# Patient Record
Sex: FEMALE | Race: WHITE | NOT HISPANIC OR LATINO | Employment: UNEMPLOYED | URBAN - METROPOLITAN AREA
[De-identification: names, ages, dates, MRNs, and addresses within clinical notes are randomized per-mention and may not be internally consistent; named-entity substitution may affect disease eponyms.]

---

## 2017-05-23 RX ORDER — PRAMIPEXOLE DIHYDROCHLORIDE 0.5 MG/1
1.5 TABLET ORAL NIGHTLY
Qty: 90 TABLET | Refills: 3 | OUTPATIENT
Start: 2017-05-23

## 2017-07-11 PROBLEM — M54.12 CERVICAL RADICULOPATHY: Status: ACTIVE | Noted: 2017-07-11

## 2017-07-11 PROBLEM — M25.511 RIGHT SHOULDER PAIN: Status: ACTIVE | Noted: 2017-07-11

## 2017-07-13 RX ORDER — PRAMIPEXOLE DIHYDROCHLORIDE 0.5 MG/1
1.5 TABLET ORAL NIGHTLY
Qty: 90 TABLET | Refills: 3 | OUTPATIENT
Start: 2017-07-13

## 2017-07-13 RX ORDER — ALPRAZOLAM 0.5 MG/1
0.5 TABLET ORAL 3 TIMES DAILY PRN
Qty: 60 TABLET | Refills: 5 | OUTPATIENT
Start: 2017-07-13

## 2017-07-24 RX ORDER — PRAMIPEXOLE DIHYDROCHLORIDE 0.5 MG/1
1.5 TABLET ORAL NIGHTLY
Qty: 90 TABLET | Refills: 0 | Status: SHIPPED | OUTPATIENT
Start: 2017-07-24 | End: 2017-07-25 | Stop reason: SDUPTHER

## 2017-07-24 RX ORDER — ALPRAZOLAM 0.5 MG/1
0.5 TABLET ORAL 3 TIMES DAILY PRN
Qty: 60 TABLET | Refills: 0 | Status: SHIPPED | OUTPATIENT
Start: 2017-07-24 | End: 2017-07-25 | Stop reason: SDUPTHER

## 2017-07-24 NOTE — TELEPHONE ENCOUNTER
I have refilled the patient's requested medication x 1 month.  However, the patient is due for an evaluation in the office.  Call the patient on the phone and book the patient with EITHER ME OR ROLA ROOT NP for a visit.    PLEASE DOCUMENT THE FACT THAT YOU HAVE CONTACTED THE PATIENT IN THE CHART FOR FUTURE REFERENCE.    Health Maintenance Due   Topic Date Due    TETANUS VACCINE  08/27/1987    Pneumococcal PPSV23 (Medium Risk) (1) 08/27/1987    Lipid Panel  12/17/2014    Mammogram  08/01/2015

## 2017-07-25 ENCOUNTER — PATIENT MESSAGE (OUTPATIENT)
Dept: FAMILY MEDICINE | Facility: CLINIC | Age: 47
End: 2017-07-25

## 2017-07-25 ENCOUNTER — OFFICE VISIT (OUTPATIENT)
Dept: FAMILY MEDICINE | Facility: CLINIC | Age: 47
End: 2017-07-25
Payer: MEDICAID

## 2017-07-25 VITALS
TEMPERATURE: 99 F | SYSTOLIC BLOOD PRESSURE: 110 MMHG | HEIGHT: 67 IN | WEIGHT: 237 LBS | BODY MASS INDEX: 37.2 KG/M2 | DIASTOLIC BLOOD PRESSURE: 72 MMHG | HEART RATE: 74 BPM

## 2017-07-25 DIAGNOSIS — Z00.00 ANNUAL PHYSICAL EXAM: Primary | ICD-10-CM

## 2017-07-25 DIAGNOSIS — F32.A DEPRESSION, UNSPECIFIED DEPRESSION TYPE: ICD-10-CM

## 2017-07-25 DIAGNOSIS — G25.81 RLS (RESTLESS LEGS SYNDROME): ICD-10-CM

## 2017-07-25 DIAGNOSIS — G43.909 MIGRAINE WITHOUT STATUS MIGRAINOSUS, NOT INTRACTABLE, UNSPECIFIED MIGRAINE TYPE: ICD-10-CM

## 2017-07-25 DIAGNOSIS — E34.9 HORMONE DEFICIENCY: ICD-10-CM

## 2017-07-25 DIAGNOSIS — F41.9 ANXIETY: ICD-10-CM

## 2017-07-25 DIAGNOSIS — J30.1 ALLERGIC RHINITIS DUE TO POLLEN, UNSPECIFIED CHRONICITY, UNSPECIFIED SEASONALITY: ICD-10-CM

## 2017-07-25 PROCEDURE — 99999 PR PBB SHADOW E&M-EST. PATIENT-LVL IV: CPT | Mod: PBBFAC,,, | Performed by: NURSE PRACTITIONER

## 2017-07-25 PROCEDURE — 99214 OFFICE O/P EST MOD 30 MIN: CPT | Mod: PBBFAC,PO | Performed by: NURSE PRACTITIONER

## 2017-07-25 PROCEDURE — 99396 PREV VISIT EST AGE 40-64: CPT | Mod: S$PBB,,, | Performed by: NURSE PRACTITIONER

## 2017-07-25 RX ORDER — HYDROXYZINE HYDROCHLORIDE 25 MG/1
25 TABLET, FILM COATED ORAL 3 TIMES DAILY
COMMUNITY
End: 2017-07-25

## 2017-07-25 RX ORDER — BUPROPION HYDROCHLORIDE 150 MG/1
150 TABLET ORAL DAILY
COMMUNITY
End: 2017-08-11 | Stop reason: SDUPTHER

## 2017-07-25 RX ORDER — ALPRAZOLAM 0.5 MG/1
0.5 TABLET ORAL 3 TIMES DAILY PRN
Qty: 60 TABLET | Refills: 5 | Status: SHIPPED | OUTPATIENT
Start: 2017-07-25 | End: 2017-08-11 | Stop reason: SDUPTHER

## 2017-07-25 RX ORDER — PRAMIPEXOLE DIHYDROCHLORIDE 0.5 MG/1
1.5 TABLET ORAL NIGHTLY
Qty: 90 TABLET | Refills: 0 | Status: SHIPPED | OUTPATIENT
Start: 2017-07-25 | End: 2017-07-27

## 2017-07-25 NOTE — PROGRESS NOTES
Subjective:       Patient ID: Suad Chapman is a 46 y.o. female.    Chief Complaint: Medication Refill  Pt in today for annual exam. Pt states that she sees psychiatry for anxiety, depression; states started wellbutrin last week. Denies SI/HI. RLS managed with medication; states works, but not as well as it has in the past; requests adjustment. Currently taking estrogen for HRT. Allergic rhinitis managed with inhaled antihistamine. Labs, mammogram due. Declines smoking cessation program; states wants to see if wellbutrin helps. Pt has no other complaints today.  Past Medical History:   Diagnosis Date    Allergy     Anxiety     Depression     RLS (restless legs syndrome)     Sciatica      Social History     Social History    Marital status:      Spouse name: N/A    Number of children: 2    Years of education: N/A     Occupational History          Social History Main Topics    Smoking status: Current Every Day Smoker     Packs/day: 1.00     Years: 30.00     Types: Cigarettes    Smokeless tobacco: Never Used      Comment: 1ppd    Alcohol use Yes      Comment: socially    Drug use: No    Sexual activity: Yes     Partners: Male     Other Topics Concern    Not on file     Social History Narrative    No narrative on file     Past Surgical History:   Procedure Laterality Date    BREAST SURGERY  2005    reduction    COSMETIC SURGERY  2005    tummy tuck    HYSTERECTOMY      TONSILLECTOMY         HPI  Review of Systems   Constitutional: Negative.    HENT: Negative.    Eyes: Negative.    Respiratory: Negative.    Cardiovascular: Negative.    Gastrointestinal: Negative.    Endocrine: Negative.    Genitourinary: Negative.    Musculoskeletal: Negative.    Skin: Negative.    Allergic/Immunologic: Negative.    Neurological: Negative.    Psychiatric/Behavioral: Negative.        Objective:      Physical Exam   Constitutional: She is oriented to person, place, and time. She appears well-developed and  well-nourished.   HENT:   Head: Normocephalic.   Right Ear: External ear normal.   Left Ear: External ear normal.   Nose: Nose normal.   Mouth/Throat: Oropharynx is clear and moist.   Eyes: Conjunctivae are normal. Pupils are equal, round, and reactive to light.   Neck: Normal range of motion. Neck supple.   Cardiovascular: Normal rate, regular rhythm and normal heart sounds.    Pulmonary/Chest: Effort normal and breath sounds normal.   Abdominal: Soft. Bowel sounds are normal.   Musculoskeletal: Normal range of motion.   Neurological: She is alert and oriented to person, place, and time.   Skin: Skin is warm and dry. Capillary refill takes 2 to 3 seconds.   Psychiatric: She has a normal mood and affect. Her behavior is normal. Judgment and thought content normal.   Nursing note and vitals reviewed.      Assessment:       1. Annual physical exam    2. RLS (restless legs syndrome)    3. Migraine without status migrainosus, not intractable, unspecified migraine type    4. Anxiety    5. Depression, unspecified depression type    6. Allergic rhinitis due to pollen, unspecified chronicity, unspecified seasonality    7. Hormone deficiency        Plan:           Suad was seen today for medication refill.    Diagnoses and all orders for this visit:    Annual physical exam  RLS (restless legs syndrome)  Migraine without status migrainosus, not intractable, unspecified migraine type  Anxiety  Depression, unspecified depression type  Allergic rhinitis due to pollen, unspecified chronicity, unspecified seasonality  Hormone deficiency  -     Mammo Digital Screening Bilateral With CAD; Future  -     ALT (SGPT); Future  -     TSH; Future  -     CBC auto differential; Future  BMP, lipid ordered previously  Mirapex request sent to PCP to approve  Continue current medications.

## 2017-07-27 RX ORDER — PRAMIPEXOLE DIHYDROCHLORIDE 0.5 MG/1
1.5 TABLET ORAL NIGHTLY
Qty: 90 TABLET | Refills: 5 | Status: SHIPPED | OUTPATIENT
Start: 2017-07-27 | End: 2017-08-11 | Stop reason: SDUPTHER

## 2017-08-09 ENCOUNTER — HOSPITAL ENCOUNTER (OUTPATIENT)
Dept: RADIOLOGY | Facility: HOSPITAL | Age: 47
Discharge: HOME | End: 2017-08-09
Attending: NURSE PRACTITIONER
Payer: MEDICAID

## 2017-08-09 VITALS — HEIGHT: 67 IN | WEIGHT: 236 LBS | BODY MASS INDEX: 37.04 KG/M2

## 2017-08-09 DIAGNOSIS — Z00.00 ANNUAL PHYSICAL EXAM: ICD-10-CM

## 2017-08-09 PROCEDURE — 77067 SCR MAMMO BI INCL CAD: CPT | Mod: 26,,, | Performed by: RADIOLOGY

## 2017-08-09 PROCEDURE — 77067 SCR MAMMO BI INCL CAD: CPT | Mod: TC

## 2017-08-09 PROCEDURE — 77063 BREAST TOMOSYNTHESIS BI: CPT | Mod: 26,,, | Performed by: RADIOLOGY

## 2017-08-11 ENCOUNTER — PATIENT MESSAGE (OUTPATIENT)
Dept: FAMILY MEDICINE | Facility: CLINIC | Age: 47
End: 2017-08-11

## 2017-08-11 DIAGNOSIS — G43.009 NONINTRACTABLE MIGRAINE, UNSPECIFIED MIGRAINE TYPE: ICD-10-CM

## 2017-08-11 DIAGNOSIS — J30.1 CHRONIC ALLERGIC RHINITIS DUE TO POLLEN, UNSPECIFIED SEASONALITY: ICD-10-CM

## 2017-08-11 DIAGNOSIS — J30.1 CHRONIC SEASONAL ALLERGIC RHINITIS DUE TO POLLEN: ICD-10-CM

## 2017-08-11 DIAGNOSIS — J40 BRONCHITIS: ICD-10-CM

## 2017-08-11 DIAGNOSIS — H69.91 ETD (EUSTACHIAN TUBE DYSFUNCTION), RIGHT: ICD-10-CM

## 2017-08-11 RX ORDER — HYDROXYZINE PAMOATE 25 MG/1
CAPSULE ORAL
Qty: 60 CAPSULE | Refills: 5 | Status: SHIPPED | OUTPATIENT
Start: 2017-08-11 | End: 2018-11-06 | Stop reason: SDUPTHER

## 2017-08-11 RX ORDER — ALPRAZOLAM 0.5 MG/1
0.5 TABLET ORAL 3 TIMES DAILY PRN
Qty: 60 TABLET | Refills: 5 | Status: SHIPPED | OUTPATIENT
Start: 2017-08-11 | End: 2018-12-17 | Stop reason: SDUPTHER

## 2017-08-11 RX ORDER — BUPROPION HYDROCHLORIDE 150 MG/1
150 TABLET ORAL DAILY
Qty: 30 TABLET | Refills: 11 | Status: SHIPPED | OUTPATIENT
Start: 2017-08-11 | End: 2018-11-06

## 2017-08-11 RX ORDER — ALBUTEROL SULFATE 90 UG/1
2 AEROSOL, METERED RESPIRATORY (INHALATION) EVERY 6 HOURS PRN
Qty: 18 G | Refills: 11 | Status: SHIPPED | OUTPATIENT
Start: 2017-08-11 | End: 2018-11-06 | Stop reason: SDUPTHER

## 2017-08-11 RX ORDER — AZELASTINE 1 MG/ML
2 SPRAY, METERED NASAL 2 TIMES DAILY
Qty: 30 ML | Refills: 11 | Status: SHIPPED | OUTPATIENT
Start: 2017-08-11 | End: 2018-11-06

## 2017-08-11 RX ORDER — PRAMIPEXOLE DIHYDROCHLORIDE 0.5 MG/1
1.5 TABLET ORAL NIGHTLY
Qty: 45 TABLET | Refills: 11 | Status: SHIPPED | OUTPATIENT
Start: 2017-08-11 | End: 2018-08-28 | Stop reason: SDUPTHER

## 2017-08-11 RX ORDER — BUTALBITAL, ASPIRIN, AND CAFFEINE 325; 50; 40 MG/1; MG/1; MG/1
1 CAPSULE ORAL EVERY 6 HOURS PRN
Qty: 40 CAPSULE | Refills: 5 | Status: SHIPPED | OUTPATIENT
Start: 2017-08-11 | End: 2018-12-17 | Stop reason: SDUPTHER

## 2017-08-11 RX ORDER — ESTRADIOL 1 MG/1
1 TABLET ORAL DAILY
Qty: 90 TABLET | Refills: 3 | Status: SHIPPED | OUTPATIENT
Start: 2017-08-11 | End: 2018-11-06 | Stop reason: SDUPTHER

## 2017-08-11 NOTE — TELEPHONE ENCOUNTER
I have signed for the following orders AND/OR meds.  Please call the patient and ask the patient to schedule the testing AND/OR inform about any medications that were sent.      No orders of the defined types were placed in this encounter.        Medications Ordered This Encounter      albuterol 90 mcg/actuation inhaler          Sig: Inhale 2 puffs into the lungs every 6 (six) hours as needed for Wheezing or Shortness of Breath.          Dispense:  18 g          Refill:  11      alprazolam (XANAX) 0.5 MG tablet          Sig: Take 1 tablet (0.5 mg total) by mouth 3 (three) times daily as needed for Insomnia or Anxiety.          Dispense:  60 tablet          Refill:  5      azelastine (ASTELIN) 137 mcg (0.1 %) nasal spray          Si sprays (274 mcg total) by Nasal route 2 (two) times daily.          Dispense:  30 mL          Refill:  11      buPROPion (WELLBUTRIN XL) 150 MG TB24 tablet          Sig: Take 1 tablet (150 mg total) by mouth once daily.          Dispense:  30 tablet          Refill:  11      butalbital-aspirin-caffeine -40 mg (FIORINAL) -40 mg Cap          Sig: Take 1 capsule by mouth every 6 (six) hours as needed.          Dispense:  40 capsule          Refill:  5      estradiol (ESTRACE) 1 MG tablet          Sig: Take 1 tablet (1 mg total) by mouth once daily.          Dispense:  90 tablet          Refill:  3      hydrOXYzine pamoate (VISTARIL) 25 MG Cap          Sig: take 2 capsules by mouth every 4 hours if needed          Dispense:  60 capsule          Refill:  5      pramipexole (MIRAPEX) 0.5 MG tablet          Sig: Take 3 tablets (1.5 mg total) by mouth every evening.          Dispense:  45 tablet          Refill:  11

## 2017-08-22 DIAGNOSIS — E78.00 ELEVATED CHOLESTEROL: Primary | ICD-10-CM

## 2017-08-22 RX ORDER — ATORVASTATIN CALCIUM 40 MG/1
40 TABLET, FILM COATED ORAL DAILY
Qty: 30 TABLET | Refills: 5 | Status: SHIPPED | OUTPATIENT
Start: 2017-08-22 | End: 2018-11-06 | Stop reason: SDUPTHER

## 2017-08-22 RX ORDER — ATORVASTATIN CALCIUM 40 MG/1
40 TABLET, FILM COATED ORAL DAILY
COMMUNITY
End: 2017-08-22 | Stop reason: SDUPTHER

## 2017-08-22 NOTE — TELEPHONE ENCOUNTER
MD HENNY Singh Staff             Cholesterol very high.  Recommend start Lipitor 40 mg daily.  Recheck lipid panel, ALT with Dr. Pearson in 3 months. The nurse will contact you to arrange.   Thanks,   Dr. Rowe     Results released on [facility]

## 2018-05-22 RX ORDER — ALPRAZOLAM 0.5 MG/1
TABLET ORAL
Qty: 60 TABLET | Refills: 5 | OUTPATIENT
Start: 2018-05-22

## 2018-08-03 ENCOUNTER — PATIENT OUTREACH (OUTPATIENT)
Dept: ADMINISTRATIVE | Facility: HOSPITAL | Age: 48
End: 2018-08-03

## 2018-08-20 RX ORDER — PRAMIPEXOLE DIHYDROCHLORIDE 0.5 MG/1
TABLET ORAL
Qty: 90 TABLET | Refills: 0 | OUTPATIENT
Start: 2018-08-20

## 2018-08-20 RX ORDER — PRAMIPEXOLE DIHYDROCHLORIDE 0.5 MG/1
TABLET ORAL
Qty: 45 TABLET | Refills: 0 | OUTPATIENT
Start: 2018-08-20

## 2018-08-29 DIAGNOSIS — G43.009 NONINTRACTABLE MIGRAINE, UNSPECIFIED MIGRAINE TYPE: ICD-10-CM

## 2018-08-29 RX ORDER — ALBUTEROL SULFATE 90 UG/1
2 AEROSOL, METERED RESPIRATORY (INHALATION) EVERY 6 HOURS PRN
Qty: 18 G | Refills: 11 | OUTPATIENT
Start: 2018-08-29

## 2018-08-29 RX ORDER — ESTRADIOL 1 MG/1
1 TABLET ORAL DAILY
Qty: 90 TABLET | Refills: 3 | OUTPATIENT
Start: 2018-08-29

## 2018-08-29 RX ORDER — PRAMIPEXOLE DIHYDROCHLORIDE 0.5 MG/1
1.5 TABLET ORAL NIGHTLY
Qty: 45 TABLET | Refills: 11 | OUTPATIENT
Start: 2018-08-29

## 2018-08-29 RX ORDER — PRAMIPEXOLE DIHYDROCHLORIDE 0.5 MG/1
TABLET ORAL
Qty: 45 TABLET | Refills: 0 | Status: SHIPPED | OUTPATIENT
Start: 2018-08-29 | End: 2018-11-06 | Stop reason: SDUPTHER

## 2018-08-29 RX ORDER — ALPRAZOLAM 0.5 MG/1
0.5 TABLET ORAL 3 TIMES DAILY PRN
Qty: 60 TABLET | Refills: 5 | OUTPATIENT
Start: 2018-08-29

## 2018-08-29 RX ORDER — HYDROXYZINE PAMOATE 25 MG/1
CAPSULE ORAL
Qty: 60 CAPSULE | Refills: 5 | OUTPATIENT
Start: 2018-08-29

## 2018-08-29 RX ORDER — BUTALBITAL, ASPIRIN, AND CAFFEINE 325; 50; 40 MG/1; MG/1; MG/1
1 CAPSULE ORAL EVERY 6 HOURS PRN
Qty: 40 CAPSULE | Refills: 5 | OUTPATIENT
Start: 2018-08-29

## 2018-08-29 NOTE — TELEPHONE ENCOUNTER
Patient states that the mirapex she is taking 3 a day and wants 90 instead of 45  Please change quanity if that is approved

## 2018-11-06 ENCOUNTER — OFFICE VISIT (OUTPATIENT)
Dept: FAMILY MEDICINE | Facility: CLINIC | Age: 48
End: 2018-11-06
Payer: MEDICAID

## 2018-11-06 VITALS
TEMPERATURE: 98 F | DIASTOLIC BLOOD PRESSURE: 80 MMHG | WEIGHT: 250 LBS | BODY MASS INDEX: 39.24 KG/M2 | HEIGHT: 67 IN | HEART RATE: 102 BPM | SYSTOLIC BLOOD PRESSURE: 128 MMHG | RESPIRATION RATE: 16 BRPM

## 2018-11-06 DIAGNOSIS — G25.81 RLS (RESTLESS LEGS SYNDROME): ICD-10-CM

## 2018-11-06 DIAGNOSIS — F41.9 ANXIETY: ICD-10-CM

## 2018-11-06 DIAGNOSIS — E34.9 HORMONE DEFICIENCY: ICD-10-CM

## 2018-11-06 DIAGNOSIS — E78.00 ELEVATED CHOLESTEROL: ICD-10-CM

## 2018-11-06 DIAGNOSIS — Z12.31 ENCOUNTER FOR SCREENING MAMMOGRAM FOR BREAST CANCER: ICD-10-CM

## 2018-11-06 DIAGNOSIS — F32.A DEPRESSION, UNSPECIFIED DEPRESSION TYPE: ICD-10-CM

## 2018-11-06 DIAGNOSIS — J01.90 ACUTE SINUSITIS, RECURRENCE NOT SPECIFIED, UNSPECIFIED LOCATION: Primary | ICD-10-CM

## 2018-11-06 PROCEDURE — 99999 PR PBB SHADOW E&M-EST. PATIENT-LVL IV: CPT | Mod: PBBFAC,,, | Performed by: NURSE PRACTITIONER

## 2018-11-06 PROCEDURE — 99214 OFFICE O/P EST MOD 30 MIN: CPT | Mod: S$PBB,,, | Performed by: NURSE PRACTITIONER

## 2018-11-06 PROCEDURE — 99214 OFFICE O/P EST MOD 30 MIN: CPT | Mod: PBBFAC,PO | Performed by: NURSE PRACTITIONER

## 2018-11-06 RX ORDER — HYDROXYZINE PAMOATE 25 MG/1
CAPSULE ORAL
Qty: 60 CAPSULE | Refills: 5 | Status: SHIPPED | OUTPATIENT
Start: 2018-11-06 | End: 2018-12-17 | Stop reason: SDUPTHER

## 2018-11-06 RX ORDER — ESTRADIOL 1 MG/1
1 TABLET ORAL DAILY
Qty: 30 TABLET | Refills: 0 | Status: SHIPPED | OUTPATIENT
Start: 2018-11-06 | End: 2018-12-17

## 2018-11-06 RX ORDER — AMOXICILLIN AND CLAVULANATE POTASSIUM 875; 125 MG/1; MG/1
1 TABLET, FILM COATED ORAL EVERY 12 HOURS
Qty: 20 TABLET | Refills: 0 | Status: SHIPPED | OUTPATIENT
Start: 2018-11-06 | End: 2018-11-16

## 2018-11-06 RX ORDER — ATORVASTATIN CALCIUM 40 MG/1
40 TABLET, FILM COATED ORAL DAILY
Qty: 30 TABLET | Refills: 0 | Status: SHIPPED | OUTPATIENT
Start: 2018-11-06 | End: 2018-12-17 | Stop reason: SDUPTHER

## 2018-11-06 RX ORDER — PRAMIPEXOLE DIHYDROCHLORIDE 0.5 MG/1
TABLET ORAL
Qty: 90 TABLET | Refills: 0 | Status: SHIPPED | OUTPATIENT
Start: 2018-11-06 | End: 2018-12-17 | Stop reason: SDUPTHER

## 2018-11-06 RX ORDER — CITALOPRAM 40 MG/1
40 TABLET, FILM COATED ORAL DAILY
Qty: 30 TABLET | Refills: 0 | Status: SHIPPED | OUTPATIENT
Start: 2018-11-06 | End: 2018-12-17 | Stop reason: SDUPTHER

## 2018-11-06 RX ORDER — MONTELUKAST SODIUM 10 MG/1
10 TABLET ORAL NIGHTLY
Qty: 30 TABLET | Refills: 0 | Status: SHIPPED | OUTPATIENT
Start: 2018-11-06 | End: 2018-12-06

## 2018-11-06 RX ORDER — ALBUTEROL SULFATE 90 UG/1
2 AEROSOL, METERED RESPIRATORY (INHALATION) EVERY 6 HOURS PRN
Qty: 18 G | Refills: 0 | Status: SHIPPED | OUTPATIENT
Start: 2018-11-06 | End: 2020-03-10 | Stop reason: SDUPTHER

## 2018-11-06 RX ORDER — CITALOPRAM 40 MG/1
TABLET, FILM COATED ORAL
COMMUNITY
End: 2018-11-06 | Stop reason: SDUPTHER

## 2018-11-06 NOTE — PROGRESS NOTES
"Subjective:      Patient ID: Suad Chapman is a 48 y.o. female.    Chief Complaint: Sinus Problem; Nasal Congestion; Otalgia; Medication Refill; and Chest Congestion    Sinus Problem   This is a new problem. The current episode started more than 1 month ago. The problem has been gradually worsening since onset. There has been no fever. The pain is moderate. Associated symptoms include congestion, coughing, ear pain, headaches and sinus pressure. Pertinent negatives include no chills or shortness of breath. Treatments tried: PCN, Z-pack, OTC meds. The treatment provided mild relief.   Patient also requesting refill of Lipitor, Celexa, Estrace, Vistaril, Mirapex.  She has not had an annual exam in a over year and not had labs.  She states she has not made an annual appointment because "they just refill my medications so I don't come in."  She has been out of some of her medications.  She is requesting an increase of her estrace due to recent hot flashes.  She is a smoker and states, "I am not quitting".  She has been out of Mirapex and had increased RLS symptoms.    Review of Systems   Constitutional: Negative for chills, fatigue and fever.   HENT: Positive for congestion, ear pain, postnasal drip, rhinorrhea and sinus pressure.    Eyes: Negative.    Respiratory: Positive for cough. Negative for shortness of breath and wheezing.    Cardiovascular: Negative for chest pain, palpitations and leg swelling.   Gastrointestinal: Negative.    Endocrine: Negative.    Genitourinary: Negative.    Musculoskeletal: Negative.         RLS   Skin: Negative for rash and wound.   Neurological: Positive for headaches.   Hematological: Negative.    Psychiatric/Behavioral: The patient is not nervous/anxious.        Objective:     /80   Pulse 102   Temp 98.2 °F (36.8 °C) (Oral)   Resp 16   Ht 5' 7" (1.702 m)   Wt 113.4 kg (250 lb)   BMI 39.16 kg/m²     Physical Exam   Constitutional: She is oriented to person, place, and time. She " appears well-developed and well-nourished.   HENT:   Head: Normocephalic.   Right Ear: A middle ear effusion is present.   Left Ear: A middle ear effusion is present.   Nose: Mucosal edema and rhinorrhea (nasal mucosa erythematous and boggy) present. Right sinus exhibits maxillary sinus tenderness and frontal sinus tenderness. Left sinus exhibits no maxillary sinus tenderness and no frontal sinus tenderness.   Mouth/Throat: Posterior oropharyngeal erythema (clear, post nasal drainage noted to posterior oropharynx) present. No oropharyngeal exudate or posterior oropharyngeal edema.   Eyes: Conjunctivae and lids are normal. Pupils are equal, round, and reactive to light.   Neck: Normal range of motion and full passive range of motion without pain. Neck supple.   Cardiovascular: Normal rate, regular rhythm and normal heart sounds.   Pulmonary/Chest: Effort normal and breath sounds normal. No respiratory distress. She has no decreased breath sounds. She has no wheezes. She has no rhonchi. She has no rales.   Musculoskeletal: Normal range of motion.   Lymphadenopathy:     She has no cervical adenopathy.   Neurological: She is alert and oriented to person, place, and time.   Skin: Skin is warm and dry. No rash noted.   Psychiatric: She has a normal mood and affect. Her behavior is normal. Judgment and thought content normal.     Assessment:     1. Acute sinusitis, recurrence not specified, unspecified location    2. Elevated cholesterol    3. RLS (restless legs syndrome)    4. Anxiety    5. Depression, unspecified depression type    6. Hormone deficiency    7. Encounter for screening mammogram for breast cancer        Plan:     Problem List Items Addressed This Visit        Neuro    RLS (restless legs syndrome)    Relevant Medications    hydrOXYzine pamoate (VISTARIL) 25 MG Cap    pramipexole (MIRAPEX) 0.5 MG tablet    Other Relevant Orders    CBC auto differential    Comprehensive metabolic panel    Lipid panel     Vitamin D    TSH       Psychiatric    Anxiety    Relevant Medications    citalopram (CELEXA) 40 MG tablet    hydrOXYzine pamoate (VISTARIL) 25 MG Cap    Other Relevant Orders    CBC auto differential    Comprehensive metabolic panel    Lipid panel    Vitamin D    TSH    Depression    Relevant Medications    citalopram (CELEXA) 40 MG tablet    Other Relevant Orders    CBC auto differential    Comprehensive metabolic panel    Lipid panel    Vitamin D    TSH       Endocrine    Hormone deficiency    Relevant Medications    estradiol (ESTRACE) 1 MG tablet    Other Relevant Orders    CBC auto differential    Comprehensive metabolic panel    Lipid panel    Vitamin D    TSH      Other Visit Diagnoses     Acute sinusitis, recurrence not specified, unspecified location    -  Primary    Relevant Medications    amoxicillin-clavulanate 875-125mg (AUGMENTIN) 875-125 mg per tablet    montelukast (SINGULAIR) 10 mg tablet    albuterol (PROVENTIL/VENTOLIN HFA) 90 mcg/actuation inhaler    Elevated cholesterol        Relevant Medications    atorvastatin (LIPITOR) 40 MG tablet    Other Relevant Orders    CBC auto differential    Comprehensive metabolic panel    Lipid panel    Vitamin D    TSH    Encounter for screening mammogram for breast cancer        Relevant Orders    Mammo Digital Screening Bilateral With CAD      Patient became angry when discussing risk factors associated with increasing estrogen use due to her smoking,and age and stated she will find a doctor who prescribed way she wants to take it.  I have refilled her requested medications x one month only.  I have asked her to f/u with Dr. Pearson for annual exam and ordered labs/mammo.  Symptomatic care discussed   Report to ER if symptoms worsen    Follow-up if symptoms worsen or fail to improve.        Parts of this note was dictated using voice recognition software. Please excuse any grammatical or typographical errors.

## 2018-11-06 NOTE — Clinical Note
"This patient was upset that I asked her to schedule an annual exam and labs.  She stated, "I haven't come in because they just refill my medicine when I call for them".  She also is requesting to take double her Estrace due to hot flashes.  She is a long time smoker, 49 yo, and overweight.  We discussed her risk factors and discussed her entering a smoking cessation program but she stated she is not going to quit smoking and if I do not increase her Estrace she will find a doctor who will do what she wants.  I wanted to send this to you because she is seeing you for her annual next month."

## 2018-12-03 ENCOUNTER — PATIENT OUTREACH (OUTPATIENT)
Dept: ADMINISTRATIVE | Facility: HOSPITAL | Age: 48
End: 2018-12-03

## 2018-12-17 ENCOUNTER — OFFICE VISIT (OUTPATIENT)
Dept: FAMILY MEDICINE | Facility: CLINIC | Age: 48
End: 2018-12-17
Payer: MEDICAID

## 2018-12-17 ENCOUNTER — HOSPITAL ENCOUNTER (OUTPATIENT)
Dept: RADIOLOGY | Facility: HOSPITAL | Age: 48
Discharge: HOME | End: 2018-12-17
Attending: FAMILY MEDICINE
Payer: MEDICAID

## 2018-12-17 ENCOUNTER — PATIENT MESSAGE (OUTPATIENT)
Dept: FAMILY MEDICINE | Facility: CLINIC | Age: 48
End: 2018-12-17

## 2018-12-17 VITALS
HEART RATE: 96 BPM | SYSTOLIC BLOOD PRESSURE: 108 MMHG | DIASTOLIC BLOOD PRESSURE: 75 MMHG | WEIGHT: 245.63 LBS | HEIGHT: 67 IN | BODY MASS INDEX: 38.55 KG/M2 | TEMPERATURE: 98 F

## 2018-12-17 DIAGNOSIS — R05.9 COUGH: ICD-10-CM

## 2018-12-17 DIAGNOSIS — G43.009 NONINTRACTABLE MIGRAINE, UNSPECIFIED MIGRAINE TYPE: ICD-10-CM

## 2018-12-17 DIAGNOSIS — Z00.00 ANNUAL PHYSICAL EXAM: Primary | ICD-10-CM

## 2018-12-17 DIAGNOSIS — E78.00 ELEVATED CHOLESTEROL: ICD-10-CM

## 2018-12-17 DIAGNOSIS — M54.31 SCIATICA OF RIGHT SIDE: ICD-10-CM

## 2018-12-17 DIAGNOSIS — M54.12 CERVICAL RADICULOPATHY: ICD-10-CM

## 2018-12-17 DIAGNOSIS — F32.A DEPRESSION, UNSPECIFIED DEPRESSION TYPE: ICD-10-CM

## 2018-12-17 DIAGNOSIS — F41.9 ANXIETY: ICD-10-CM

## 2018-12-17 DIAGNOSIS — E34.9 HORMONE DEFICIENCY: ICD-10-CM

## 2018-12-17 DIAGNOSIS — E78.00 PURE HYPERCHOLESTEROLEMIA: ICD-10-CM

## 2018-12-17 DIAGNOSIS — R93.7 ABNORMAL X-RAY OF CERVICAL SPINE: ICD-10-CM

## 2018-12-17 DIAGNOSIS — F17.219 CIGARETTE NICOTINE DEPENDENCE WITH NICOTINE-INDUCED DISORDER: ICD-10-CM

## 2018-12-17 DIAGNOSIS — J34.89 SINUS PAIN: ICD-10-CM

## 2018-12-17 DIAGNOSIS — G43.909 MIGRAINE WITHOUT STATUS MIGRAINOSUS, NOT INTRACTABLE, UNSPECIFIED MIGRAINE TYPE: ICD-10-CM

## 2018-12-17 DIAGNOSIS — G25.81 RLS (RESTLESS LEGS SYNDROME): ICD-10-CM

## 2018-12-17 PROBLEM — E55.9 VITAMIN D DEFICIENCY: Status: ACTIVE | Noted: 2018-12-17

## 2018-12-17 PROCEDURE — 71046 X-RAY EXAM CHEST 2 VIEWS: CPT | Mod: 26,,, | Performed by: RADIOLOGY

## 2018-12-17 PROCEDURE — 70220 X-RAY EXAM OF SINUSES: CPT | Mod: TC,PO

## 2018-12-17 PROCEDURE — 99396 PREV VISIT EST AGE 40-64: CPT | Mod: S$PBB,,, | Performed by: FAMILY MEDICINE

## 2018-12-17 PROCEDURE — 99215 OFFICE O/P EST HI 40 MIN: CPT | Mod: PBBFAC,25,PO | Performed by: FAMILY MEDICINE

## 2018-12-17 PROCEDURE — 90686 IIV4 VACC NO PRSV 0.5 ML IM: CPT | Mod: PBBFAC,PO

## 2018-12-17 PROCEDURE — 99213 OFFICE O/P EST LOW 20 MIN: CPT | Mod: 25,S$PBB,, | Performed by: FAMILY MEDICINE

## 2018-12-17 PROCEDURE — 71046 X-RAY EXAM CHEST 2 VIEWS: CPT | Mod: TC,PO

## 2018-12-17 PROCEDURE — 99999 PR PBB SHADOW E&M-EST. PATIENT-LVL V: CPT | Mod: PBBFAC,,, | Performed by: FAMILY MEDICINE

## 2018-12-17 PROCEDURE — 90732 PPSV23 VACC 2 YRS+ SUBQ/IM: CPT | Mod: PBBFAC,PO

## 2018-12-17 PROCEDURE — 70220 X-RAY EXAM OF SINUSES: CPT | Mod: 26,,, | Performed by: RADIOLOGY

## 2018-12-17 RX ORDER — MELOXICAM 15 MG/1
15 TABLET ORAL DAILY
Qty: 30 TABLET | Refills: 2 | Status: SHIPPED | OUTPATIENT
Start: 2018-12-17 | End: 2019-12-17

## 2018-12-17 RX ORDER — PRAMIPEXOLE DIHYDROCHLORIDE 0.5 MG/1
TABLET ORAL
Qty: 90 TABLET | Refills: 11 | Status: SHIPPED | OUTPATIENT
Start: 2018-12-17 | End: 2020-03-10 | Stop reason: SDUPTHER

## 2018-12-17 RX ORDER — ESTRADIOL 0.5 MG/1
1 TABLET ORAL DAILY
Qty: 30 TABLET | Refills: 11 | Status: SHIPPED | OUTPATIENT
Start: 2018-12-17 | End: 2020-03-10

## 2018-12-17 RX ORDER — HYDROXYZINE PAMOATE 25 MG/1
CAPSULE ORAL
Qty: 60 CAPSULE | Refills: 11 | Status: SHIPPED | OUTPATIENT
Start: 2018-12-17 | End: 2020-03-10 | Stop reason: CLARIF

## 2018-12-17 RX ORDER — CITALOPRAM 40 MG/1
40 TABLET, FILM COATED ORAL DAILY
Qty: 30 TABLET | Refills: 11 | Status: SHIPPED | OUTPATIENT
Start: 2018-12-17 | End: 2020-03-10

## 2018-12-17 RX ORDER — ALPRAZOLAM 0.5 MG/1
0.5 TABLET ORAL 3 TIMES DAILY PRN
Qty: 60 TABLET | Refills: 5 | Status: SHIPPED | OUTPATIENT
Start: 2018-12-17

## 2018-12-17 RX ORDER — BUTALBITAL, ASPIRIN, AND CAFFEINE 325; 50; 40 MG/1; MG/1; MG/1
1 CAPSULE ORAL EVERY 6 HOURS PRN
Qty: 40 CAPSULE | Refills: 0 | Status: SHIPPED | OUTPATIENT
Start: 2018-12-17 | End: 2020-03-10 | Stop reason: SDUPTHER

## 2018-12-17 RX ORDER — ATORVASTATIN CALCIUM 40 MG/1
40 TABLET, FILM COATED ORAL DAILY
Qty: 30 TABLET | Refills: 11 | Status: SHIPPED | OUTPATIENT
Start: 2018-12-17 | End: 2020-03-10 | Stop reason: SDUPTHER

## 2018-12-17 NOTE — PROGRESS NOTES
There is No evidence of acute or chronic sinusitis on this exam.  This is good news.  Please increase the dose of the mucinex to 600 mg twice a day and may increase that to 1200 mg twice a day.  I still advise that you stop smoking.  If this does not improve, we may need to have a pulmonary function study ordered.

## 2018-12-17 NOTE — PROGRESS NOTES
There is No evidence of a lung mass or fluid on this exam.  This is good news.  Please increase the dose of the mucinex to 600 mg twice a day and may increase that to 1200 mg twice a day.  I still advise that you stop smoking.  If this does not improve, we may need to have a pulmonary function study ordered.

## 2018-12-17 NOTE — PROGRESS NOTES
Subjective:      Patient ID: Suad Chapman is a 48 y.o. female.    Chief Complaint: Annual Exam    Problem List Items Addressed This Visit     Anxiety    Overview     The patient has anxiety which been treated over time with xanax and celexa.  Treatment has been given since several years ago.  This has controled the symptoms.  Things that makes it worse include stress and xanax and celexa makes it better.           Cervical radiculopathy    Overview     She has a chronic problem with right neck pain and she has radiculopathy with numbness on her right hand in the median nerve distribution and she has pain in the right hand night.   She has tightness and pain in her neck and laying on her right side causes this to worsen.  This makes her numb in the right arm.           Cigarette nicotine dependence with nicotine-induced disorder    Overview     We discussed quitting smoking. She is not ready to stop this at this point despite chronic problems with a cough.          Cough    Overview     She has a chronic cough that has been noted for a few months. She states that she has been a smoker for a long time and she has not had a CXR.  This is a new problem that she wanted addressed today.         Depression    Overview     She has major depression that has been treated for celexa for a year and this has helped. She was with a psychologist in [place] and was given this and it helped with her agoraphobia also.           Hormone deficiency    Overview     She had a complete hysterectomy in 2005 and she has had estrace since then. She has tried weaning.  She did have flashes with this. she cold turkeyed it though.  She and I discussed risks of strokes, heart attacks and earlier death with smoking. She is not wanting to quit it.   She is willing to decrease the dose.         Migraines    Overview     She has chronic migraines and they are very infrequent but she is wanting to have some of the fioricet that she uses on hand for  prn use when she gets one.         Pure hypercholesterolemia    Overview     The patient presents with hyperlipidemia.  The patient reports tolerating the medication well and is in excellent compliance.  There have been no medication side effects.  The patient denies chest pain, neuropathy, and myalgias.  The patient has reduced fat intake and has been exercising.  Current treatment has included the medications listed in the med card.    Lab Results   Component Value Date    CHOL 324 (H) 08/09/2017    CHOL 360 (H) 12/17/2009       Lab Results   Component Value Date    HDL 43 08/09/2017    HDL 43 12/17/2009       Lab Results   Component Value Date    LDLCALC 233.6 (H) 08/09/2017    LDLCALC 262.6 (H) 12/17/2009       Lab Results   Component Value Date    TRIG 237 (H) 08/09/2017    TRIG 272 (H) 12/17/2009       Lab Results   Component Value Date    CHOLHDL 13.3 (L) 08/09/2017    CHOLHDL 11.9 (L) 12/17/2009     Lab Results   Component Value Date    ALT 25 08/09/2017    AST 15 12/17/2009    ALKPHOS 80 12/17/2009    BILITOT 0.2 12/17/2009              RLS (restless legs syndrome)    Overview     She has had RLS for a very long time and she started the mirapex many years ago and it has been helpful.           Sciatica of right side    Overview     She has chronic sciatica and she has had increased problems with pain down the legs. It is bilateral.           Sinus pain    Overview     She has chronic sinus pain and she ahs had this for months. She has a cough along with thick mucus. She has had a zpack and singulair and this has not helped her. She has used OTC medications also.  She has flonase and she has used this. She always has a thick mucus noted in the chest.  She is a smoker and she states that she has cut back as she had increased her smoking about 6-8 months ago.           Other Visit Diagnoses     Annual physical exam    -  Primary    Abnormal x-ray of cervical spine        Elevated cholesterol         Nonintractable migraine, unspecified migraine type              Past Medical History:  Past Medical History:   Diagnosis Date    Allergy     Anxiety     Depression     RLS (restless legs syndrome)     Sciatica      Past Surgical History:   Procedure Laterality Date    COSMETIC SURGERY  2005    tummy tuck    HYSTERECTOMY      OOPHORECTOMY      TONSILLECTOMY      TOTAL REDUCTION MAMMOPLASTY       Review of patient's allergies indicates:   Allergen Reactions    Sulfa (sulfonamide antibiotics) Rash    Trazodone Anaphylaxis    Tramadol Other (See Comments)     Headaches, elevates blood pressure    Medrol [methylprednisolone] Hives    Sulfamethoxazole-trimethoprim Hives    Corticosteroids (glucocorticoids) Palpitations     Current Outpatient Medications on File Prior to Visit   Medication Sig Dispense Refill    albuterol (PROVENTIL/VENTOLIN HFA) 90 mcg/actuation inhaler Inhale 2 puffs into the lungs every 6 (six) hours as needed for Wheezing. Rescue 18 g 0    alprazolam (XANAX) 0.5 MG tablet Take 1 tablet (0.5 mg total) by mouth 3 (three) times daily as needed for Insomnia or Anxiety. 60 tablet 5    atorvastatin (LIPITOR) 40 MG tablet Take 1 tablet (40 mg total) by mouth once daily. 30 tablet 0    butalbital-aspirin-caffeine -40 mg (FIORINAL) -40 mg Cap Take 1 capsule by mouth every 6 (six) hours as needed. 40 capsule 5    citalopram (CELEXA) 40 MG tablet Take 1 tablet (40 mg total) by mouth once daily. 30 tablet 0    estradiol (ESTRACE) 1 MG tablet Take 1 tablet (1 mg total) by mouth once daily. 30 tablet 0    hydrOXYzine pamoate (VISTARIL) 25 MG Cap take 2 capsules by mouth every 4 hours if needed 60 capsule 5    pramipexole (MIRAPEX) 0.5 MG tablet TAKE 1 TABLET BY MOUTH THREE TIMES DAILY( EVERY EIGHT HOURS) 90 tablet 0    [DISCONTINUED] albuterol (PROVENTIL/VENTOLIN) 90 mcg/actuation inhaler Inhale 2 puffs into the lungs every 6 (six) hours as needed for Wheezing. 3 each 3     No  current facility-administered medications on file prior to visit.      Social History     Socioeconomic History    Marital status:      Spouse name: Not on file    Number of children: 2    Years of education: Not on file    Highest education level: Not on file   Social Needs    Financial resource strain: Not on file    Food insecurity - worry: Not on file    Food insecurity - inability: Not on file    Transportation needs - medical: Not on file    Transportation needs - non-medical: Not on file   Occupational History    Occupation:    Tobacco Use    Smoking status: Current Every Day Smoker     Packs/day: 1.00     Years: 30.00     Pack years: 30.00     Types: Cigarettes    Smokeless tobacco: Never Used    Tobacco comment: 1ppd   Substance and Sexual Activity    Alcohol use: Yes     Comment: socially    Drug use: No    Sexual activity: Yes     Partners: Male   Other Topics Concern    Not on file   Social History Narrative    Not on file     Family History   Problem Relation Age of Onset    Diabetes Mother     Diabetes Father        Review of Systems   Constitutional: Negative for fatigue, fever and unexpected weight change.   HENT: Negative for congestion, ear pain, postnasal drip and sore throat.    Eyes: Negative for visual disturbance.   Respiratory: Negative for cough, chest tightness, shortness of breath and wheezing.    Cardiovascular: Negative for chest pain, palpitations and leg swelling.   Gastrointestinal: Negative for abdominal pain, blood in stool, constipation, diarrhea, nausea and vomiting.   Genitourinary: Negative for dysuria and hematuria.   Musculoskeletal: Positive for arthralgias, back pain, neck pain and neck stiffness.   Neurological: Positive for numbness. Negative for dizziness and weakness.   Psychiatric/Behavioral: Positive for agitation, confusion, decreased concentration, dysphoric mood and self-injury. The patient is nervous/anxious.        Objective:  "    /75   Pulse 96   Temp 98.4 °F (36.9 °C) (Oral)   Ht 5' 7" (1.702 m)   Wt 111.4 kg (245 lb 9.6 oz)   BMI 38.47 kg/m²     Physical Exam   Constitutional: She appears well-developed and well-nourished. She is cooperative.   HENT:   Head: Normocephalic and atraumatic.   Right Ear: Tympanic membrane, external ear and ear canal normal.   Left Ear: Tympanic membrane, external ear and ear canal normal.   Nose: Nose normal.   Mouth/Throat: Uvula is midline and mucous membranes are normal. No oral lesions. No oropharyngeal exudate, posterior oropharyngeal edema or posterior oropharyngeal erythema.   Eyes: EOM and lids are normal. Pupils are equal, round, and reactive to light. Right eye exhibits no discharge. Left eye exhibits no discharge. Right conjunctiva is not injected. Right conjunctiva has no hemorrhage. Left conjunctiva is not injected. Left conjunctiva has no hemorrhage. No scleral icterus. Right eye exhibits no nystagmus. Left eye exhibits no nystagmus.   Neck: Full passive range of motion without pain. No JVD present. Spinous process tenderness and muscular tenderness present. No tracheal tenderness present. Carotid bruit is not present. Decreased range of motion present. No tracheal deviation present. No thyroid mass and no thyromegaly present.   Cardiovascular: Normal rate, regular rhythm, S1 normal and S2 normal.   No murmur heard.  Pulses:       Carotid pulses are 2+ on the right side, and 2+ on the left side.       Radial pulses are 2+ on the right side, and 2+ on the left side.        Posterior tibial pulses are 2+ on the right side, and 2+ on the left side.   Pulmonary/Chest: Effort normal and breath sounds normal. No respiratory distress. She has no wheezes. She has no rhonchi. She has no rales.   Abdominal: Soft. Normal appearance, normal aorta and bowel sounds are normal. She exhibits no distension, no abdominal bruit, no pulsatile midline mass and no mass. There is no hepatosplenomegaly. " There is no tenderness. There is no rebound.   Musculoskeletal:        Right knee: She exhibits no swelling. No tenderness found.        Left knee: She exhibits no swelling. No tenderness found.   Lymphadenopathy:        Head (right side): No submental and no submandibular adenopathy present.        Head (left side): No submental and no submandibular adenopathy present.     She has no cervical adenopathy.   Neurological: She is alert. She has normal strength. No cranial nerve deficit or sensory deficit.   Skin: Skin is warm and dry. No rash noted. No cyanosis. Nails show no clubbing.   Psychiatric: She has a normal mood and affect. Her speech is normal and behavior is normal. Thought content normal. Cognition and memory are normal.     Assessment:     1. Annual physical exam    2. Anxiety    3. Cough    4. Sinus pain    5. Cigarette nicotine dependence with nicotine-induced disorder    6. Cervical radiculopathy    7. Abnormal x-ray of cervical spine    8. Pure hypercholesterolemia    9. RLS (restless legs syndrome)    10. Depression, unspecified depression type    11. Hormone deficiency    12. Elevated cholesterol    13. Nonintractable migraine, unspecified migraine type    14. Migraine without status migrainosus, not intractable, unspecified migraine type    15. Sciatica of right side        Plan:     Problem List Items Addressed This Visit     Anxiety    Relevant Medications    hydrOXYzine pamoate (VISTARIL) 25 MG Cap    ALPRAZolam (XANAX) 0.5 MG tablet    citalopram (CELEXA) 40 MG tablet    Cervical radiculopathy    Relevant Medications    meloxicam (MOBIC) 15 MG tablet    Cigarette nicotine dependence with nicotine-induced disorder    Cough    Relevant Orders    X-Ray Sinuses Min 3 Views    X-Ray Chest PA And Lateral    Depression    Relevant Medications    citalopram (CELEXA) 40 MG tablet    Hormone deficiency    Relevant Medications    estradiol (ESTRACE) 0.5 MG tablet    Migraines    Relevant Medications     butalbital-aspirin-caffeine -40 mg (FIORINAL) -40 mg Cap    meloxicam (MOBIC) 15 MG tablet    Pure hypercholesterolemia    RLS (restless legs syndrome)    Relevant Medications    hydrOXYzine pamoate (VISTARIL) 25 MG Cap    pramipexole (MIRAPEX) 0.5 MG tablet    Sciatica of right side    Relevant Medications    meloxicam (MOBIC) 15 MG tablet    Sinus pain    Relevant Orders    X-Ray Sinuses Min 3 Views      Other Visit Diagnoses     Annual physical exam    -  Primary    Relevant Orders    Influenza - Quadrivalent (3 years & older) (PF)    Pneumococcal Polysaccharide Vaccine (23 Valent) (SQ/IM)    Comprehensive metabolic panel    Lipid panel    Abnormal x-ray of cervical spine        Relevant Orders    MRI Cervical Spine W WO Cont    Elevated cholesterol        Relevant Medications    atorvastatin (LIPITOR) 40 MG tablet    Other Relevant Orders    Lipid panel    Nonintractable migraine, unspecified migraine type        Relevant Medications    butalbital-aspirin-caffeine -40 mg (FIORINAL) -40 mg Cap        No Follow-up on file.

## 2018-12-19 DIAGNOSIS — E55.9 VITAMIN D DEFICIENCY: Primary | ICD-10-CM

## 2018-12-19 RX ORDER — ERGOCALCIFEROL 1.25 MG/1
50000 CAPSULE ORAL
Qty: 30 CAPSULE | Refills: 3 | Status: SHIPPED | OUTPATIENT
Start: 2018-12-19 | End: 2020-03-10 | Stop reason: SDUPTHER

## 2018-12-19 NOTE — TELEPHONE ENCOUNTER
----- Message from Guicho Pearson MD sent at 12/17/2018 10:05 PM CST -----  The thyroid is normal.    The electrolytes are all normal.   The Vitamin D level is low.  This puts the patient at risk of not absorbing low amounts of calcium in the diet which can cause osteoporosis.  Because of this, I would like to start the patient on Vitamin D 50,000 units weekly.  Send in a prescription for this and also book for a recheck on the vitamin d level in 3 months.  Below is a handout on Vitamin D and what it does.      Vitamin D  Does this test have other names?  25-hydroxyvitamin D (25-high-DROX-ee-VIE-tuh-min D), 25(OH)D  What is this test?  Vitamin D is mainly found in fortified dairy foods, juice, breakfast cereal, and certain fish. This vitamin plays many roles in the body. But because it helps the body absorb calcium from foods and supplements, it's particularly important for bone health. Vitamin D has many additional roles in the body.  Vitamin D comes in several forms. When ultraviolet light, such as sunlight, hits your skin, it creates vitamin D3. D2 is used to fortify dairy foods. Both of these are further processed by your liver and kidneys into a form your body can use. Most tests for vitamin D check the level of a form circulating in the body called 25-hydroxyvitamin D, also called 25(OH)D.   Why do I need this test?  Vitamin D testing has become much more popular in recent years. Often, health care providers check vitamin D levels to investigate threats to bone health, such as low calcium; osteomalacia, or soft bones caused by low vitamin D or problems using it; osteopenia; osteoporosis; and rickets in children.  Vitamin D has many effects in the body, and testing may be needed while diagnosing or treating:  · People with risk factors for low vitamin D, such as those of older age, who have difficulty absorbing fat from the diet or have chronic kidney disease, or who are dark skin pigmentation   babies  · Problems with the parathyroid gland  · Cancer  · Autoimmune diseases such as multiple sclerosis and Crohn's disease  · Psoriasis  · Asthma  · Weakness or falls    What other tests might I have along with this test?  A health care provider may also want to check your parathyroid hormone levels and your calcium levels.   What do my test results mean?  A result for a lab test may be affected by many things, including the method the laboratory uses to do the test. If your test results are different from the normal value, you may not have a problem. To learn what the results mean for you, talk with your health care provider.  Children and adults need more than 30 nanograms per milliliter (ng/ml) of vitamin D. The optimal level of 25(OH)D is usually said to be 30 to 60 ng/mL. Recommended daily amounts range from 400 to 800 international units (IU) per day based on age.  Levels lower than normal can indicate that you're not producing enough vitamin D on your own, you're not consuming enough in your diet, you're not absorbing it properly from your food, or your body is not converting it properly, perhaps because of kidney or liver disease. Above-normal levels may be a sign that you're taking too much in supplement form.   How is this test done?  The test requires a blood sample, which is drawn through a needle from a vein in your arm.  Does this test pose any risks?  Taking a blood sample with a needle carries risks that include bleeding, infection, bruising, and a sense of lightheadedness. When the needle pricks your arm, you may feel a slight stinging sensation or pain. Afterward, the site may be slightly sore.   What might affect my test results?  The amount of time you spend in the sunlight, your diet, and whether you take vitamin D in supplement form can affect your vitamin D levels.  How do I get ready for this test?  Tell the health care provider ordering your tests if you take vitamin D supplements, which  could affect your results. Discuss whether any medical conditions you have or medications you're taking can affect your test results.   © 5429-4395 [employer], [address] [place] [address], [place] [address]. All rights reserved. This information is not intended as a substitute for professional medical care. Always follow your healthcare professional's instructions.

## 2019-01-02 ENCOUNTER — HOSPITAL ENCOUNTER (OUTPATIENT)
Dept: RADIOLOGY | Facility: HOSPITAL | Age: 49
Discharge: HOME | End: 2019-01-02
Attending: FAMILY MEDICINE
Payer: MEDICAID

## 2019-01-02 DIAGNOSIS — R93.7 ABNORMAL X-RAY OF CERVICAL SPINE: ICD-10-CM

## 2019-01-02 PROCEDURE — 72141 MRI NECK SPINE W/O DYE: CPT | Mod: TC,PO

## 2019-01-02 PROCEDURE — 72141 MRI NECK SPINE W/O DYE: CPT | Mod: 26,,, | Performed by: RADIOLOGY

## 2019-01-02 PROCEDURE — 72141 MRI CERVICAL SPINE WITHOUT CONTRAST: ICD-10-PCS | Mod: 26,,, | Performed by: RADIOLOGY

## 2019-01-03 ENCOUNTER — PATIENT MESSAGE (OUTPATIENT)
Dept: FAMILY MEDICINE | Facility: CLINIC | Age: 49
End: 2019-01-03

## 2019-01-03 DIAGNOSIS — M50.20 CERVICAL DISC HERNIATION: ICD-10-CM

## 2019-01-03 DIAGNOSIS — M54.12 CERVICAL RADICULOPATHY: Primary | ICD-10-CM

## 2019-01-04 ENCOUNTER — PATIENT MESSAGE (OUTPATIENT)
Dept: FAMILY MEDICINE | Facility: CLINIC | Age: 49
End: 2019-01-04

## 2019-01-04 NOTE — TELEPHONE ENCOUNTER
Dr. Pearson is out of the office until Monday and will be able to review the results at that time.

## 2019-01-08 PROBLEM — M50.20 CERVICAL DISC HERNIATION: Status: ACTIVE | Noted: 2019-01-08

## 2019-01-08 NOTE — PROGRESS NOTES
There are multiple changes in the cervical spine consistent with degenerative disc disease and arthritis but the most significant to me is at the C5-6 level.  At that level, there is a disc protrusion with a flattening of the front of the cord.  Based on this, I recommend that you be evaluated by a neurosurgeon.  Ask her if she would like to see one locally or out of town.  I had called her about this and left a message.  Please call her to discuss results and recommendations.

## 2019-01-09 NOTE — TELEPHONE ENCOUNTER
I have signed for the following orders AND/OR meds.  Please call the patient and ask the patient to schedule the testing AND/OR inform about any medications that were sent.      Orders Placed This Encounter   Procedures    Ambulatory referral to Neurosurgery     Referral Priority:   Routine     Referral Type:   Consultation     Referral Reason:   Specialty Services Required     Requested Specialty:   Neurosurgery     Number of Visits Requested:   1

## 2019-01-10 ENCOUNTER — TELEPHONE (OUTPATIENT)
Dept: FAMILY MEDICINE | Facility: CLINIC | Age: 49
End: 2019-01-10

## 2019-01-10 ENCOUNTER — PATIENT MESSAGE (OUTPATIENT)
Dept: FAMILY MEDICINE | Facility: CLINIC | Age: 49
End: 2019-01-10

## 2019-01-10 NOTE — TELEPHONE ENCOUNTER
I have signed for the following orders AND/OR meds.  Please call the patient and ask the patient to schedule the testing AND/OR inform about any medications that were sent.      Orders Placed This Encounter   Procedures    Ambulatory referral to Neurosurgery     Referral Priority:   Routine     Referral Type:   Consultation     Referral Reason:   Specialty Services Required     Requested Specialty:   Neurosurgery     Number of Visits Requested:   1    Ambulatory referral to Neurosurgery     Referral Priority:   Routine     Referral Type:   Consultation     Referral Reason:   Specialty Services Required     Requested Specialty:   Neurosurgery     Number of Visits Requested:   1

## 2019-01-10 NOTE — TELEPHONE ENCOUNTER
----- Message from Dana Chan sent at 1/10/2019 11:58 AM CST -----  pls fax neurosurgeon referral dr billy childers at [phone]/ph:[phone]. pls call when done..[phone] (home)

## 2019-01-10 NOTE — TELEPHONE ENCOUNTER
Pt is requesting something for her neck and back pain. She is scheduling to be seen by neurosurgery. Please advise.

## 2019-01-11 RX ORDER — GABAPENTIN 100 MG/1
100 CAPSULE ORAL 3 TIMES DAILY
Qty: 90 CAPSULE | Refills: 0 | Status: SHIPPED | OUTPATIENT
Start: 2019-01-11 | End: 2019-07-08 | Stop reason: SDUPTHER

## 2019-01-11 NOTE — TELEPHONE ENCOUNTER
Let her know to take her mobic and add gabapentin to the regimen.      Medications Ordered This Encounter   Medications    gabapentin (NEURONTIN) 100 MG capsule     Sig: Take 1 capsule (100 mg total) by mouth 3 (three) times daily.     Dispense:  90 capsule     Refill:  0

## 2019-01-16 ENCOUNTER — PATIENT MESSAGE (OUTPATIENT)
Dept: FAMILY MEDICINE | Facility: CLINIC | Age: 49
End: 2019-01-16

## 2019-07-08 DIAGNOSIS — F41.9 ANXIETY: ICD-10-CM

## 2019-07-08 RX ORDER — GABAPENTIN 100 MG/1
CAPSULE ORAL
Qty: 90 CAPSULE | Refills: 0 | Status: SHIPPED | OUTPATIENT
Start: 2019-07-08 | End: 2020-03-10

## 2019-07-08 RX ORDER — ALPRAZOLAM 0.5 MG/1
TABLET ORAL
Qty: 60 TABLET | Refills: 0 | OUTPATIENT
Start: 2019-07-08

## 2019-12-27 DIAGNOSIS — G43.009 NONINTRACTABLE MIGRAINE, UNSPECIFIED MIGRAINE TYPE: ICD-10-CM

## 2019-12-27 DIAGNOSIS — E34.9 HORMONE DEFICIENCY: ICD-10-CM

## 2019-12-27 DIAGNOSIS — G25.81 RLS (RESTLESS LEGS SYNDROME): ICD-10-CM

## 2019-12-27 DIAGNOSIS — F41.9 ANXIETY: ICD-10-CM

## 2019-12-27 DIAGNOSIS — E78.00 ELEVATED CHOLESTEROL: ICD-10-CM

## 2019-12-27 DIAGNOSIS — E55.9 VITAMIN D DEFICIENCY: ICD-10-CM

## 2019-12-27 RX ORDER — ATORVASTATIN CALCIUM 40 MG/1
40 TABLET, FILM COATED ORAL DAILY
Qty: 30 TABLET | Refills: 11 | OUTPATIENT
Start: 2019-12-27

## 2019-12-27 RX ORDER — PRAMIPEXOLE DIHYDROCHLORIDE 0.5 MG/1
TABLET ORAL
Qty: 90 TABLET | Refills: 11 | OUTPATIENT
Start: 2019-12-27

## 2019-12-27 RX ORDER — BUTALBITAL, ASPIRIN, AND CAFFEINE 325; 50; 40 MG/1; MG/1; MG/1
1 CAPSULE ORAL EVERY 6 HOURS PRN
Qty: 40 CAPSULE | Refills: 0 | OUTPATIENT
Start: 2019-12-27

## 2019-12-27 RX ORDER — ERGOCALCIFEROL 1.25 MG/1
50000 CAPSULE ORAL
Qty: 30 CAPSULE | Refills: 3 | OUTPATIENT
Start: 2019-12-27

## 2019-12-27 RX ORDER — ESTRADIOL 0.5 MG/1
1 TABLET ORAL DAILY
Qty: 30 TABLET | Refills: 11 | OUTPATIENT
Start: 2019-12-27

## 2019-12-27 RX ORDER — ALPRAZOLAM 0.5 MG/1
0.5 TABLET ORAL 3 TIMES DAILY PRN
Qty: 60 TABLET | Refills: 5 | OUTPATIENT
Start: 2019-12-27

## 2019-12-27 RX ORDER — HYDROXYZINE PAMOATE 25 MG/1
CAPSULE ORAL
Qty: 60 CAPSULE | Refills: 11 | OUTPATIENT
Start: 2019-12-27

## 2019-12-28 ENCOUNTER — PATIENT MESSAGE (OUTPATIENT)
Dept: FAMILY MEDICINE | Facility: CLINIC | Age: 49
End: 2019-12-28

## 2020-01-20 ENCOUNTER — PATIENT OUTREACH (OUTPATIENT)
Dept: ADMINISTRATIVE | Facility: HOSPITAL | Age: 50
End: 2020-01-20

## 2020-03-10 ENCOUNTER — LAB VISIT (OUTPATIENT)
Dept: LAB | Facility: HOSPITAL | Age: 50
End: 2020-03-10
Attending: NURSE PRACTITIONER
Payer: COMMERCIAL

## 2020-03-10 ENCOUNTER — PATIENT MESSAGE (OUTPATIENT)
Dept: INTERNAL MEDICINE | Facility: CLINIC | Age: 50
End: 2020-03-10

## 2020-03-10 ENCOUNTER — OFFICE VISIT (OUTPATIENT)
Dept: INTERNAL MEDICINE | Facility: CLINIC | Age: 50
End: 2020-03-10
Payer: COMMERCIAL

## 2020-03-10 VITALS
HEART RATE: 97 BPM | HEIGHT: 67 IN | DIASTOLIC BLOOD PRESSURE: 82 MMHG | TEMPERATURE: 99 F | BODY MASS INDEX: 41.59 KG/M2 | SYSTOLIC BLOOD PRESSURE: 122 MMHG | WEIGHT: 265 LBS

## 2020-03-10 DIAGNOSIS — M54.12 CERVICAL RADICULOPATHY: ICD-10-CM

## 2020-03-10 DIAGNOSIS — G43.009 NONINTRACTABLE MIGRAINE, UNSPECIFIED MIGRAINE TYPE: ICD-10-CM

## 2020-03-10 DIAGNOSIS — M89.8X9 BONE PAIN: ICD-10-CM

## 2020-03-10 DIAGNOSIS — R20.0 HAND NUMBNESS: ICD-10-CM

## 2020-03-10 DIAGNOSIS — E55.9 VITAMIN D DEFICIENCY: ICD-10-CM

## 2020-03-10 DIAGNOSIS — F41.9 ANXIETY: ICD-10-CM

## 2020-03-10 DIAGNOSIS — G43.909 MIGRAINE WITHOUT STATUS MIGRAINOSUS, NOT INTRACTABLE, UNSPECIFIED MIGRAINE TYPE: ICD-10-CM

## 2020-03-10 DIAGNOSIS — J01.90 ACUTE SINUSITIS, RECURRENCE NOT SPECIFIED, UNSPECIFIED LOCATION: ICD-10-CM

## 2020-03-10 DIAGNOSIS — F17.219 CIGARETTE NICOTINE DEPENDENCE WITH NICOTINE-INDUCED DISORDER: ICD-10-CM

## 2020-03-10 DIAGNOSIS — R53.83 FATIGUE, UNSPECIFIED TYPE: ICD-10-CM

## 2020-03-10 DIAGNOSIS — G25.81 RLS (RESTLESS LEGS SYNDROME): ICD-10-CM

## 2020-03-10 DIAGNOSIS — F32.A DEPRESSION, UNSPECIFIED DEPRESSION TYPE: ICD-10-CM

## 2020-03-10 DIAGNOSIS — Z00.00 ANNUAL PHYSICAL EXAM: ICD-10-CM

## 2020-03-10 DIAGNOSIS — E78.00 PURE HYPERCHOLESTEROLEMIA: ICD-10-CM

## 2020-03-10 DIAGNOSIS — Z00.00 ANNUAL PHYSICAL EXAM: Primary | ICD-10-CM

## 2020-03-10 DIAGNOSIS — M25.50 ARTHRALGIA, UNSPECIFIED JOINT: ICD-10-CM

## 2020-03-10 DIAGNOSIS — E78.00 ELEVATED CHOLESTEROL: ICD-10-CM

## 2020-03-10 LAB
25(OH)D3+25(OH)D2 SERPL-MCNC: 17 NG/ML (ref 30–96)
ALBUMIN SERPL BCP-MCNC: 4.3 G/DL (ref 3.5–5.2)
ALP SERPL-CCNC: 102 U/L (ref 55–135)
ALT SERPL W/O P-5'-P-CCNC: 42 U/L (ref 10–44)
ANION GAP SERPL CALC-SCNC: 11 MMOL/L (ref 8–16)
AST SERPL-CCNC: 26 U/L (ref 10–40)
BASOPHILS # BLD AUTO: 0.08 K/UL (ref 0–0.2)
BASOPHILS NFR BLD: 0.9 % (ref 0–1.9)
BILIRUB SERPL-MCNC: 0.3 MG/DL (ref 0.1–1)
BUN SERPL-MCNC: 19 MG/DL (ref 6–20)
CALCIUM SERPL-MCNC: 11 MG/DL (ref 8.7–10.5)
CHLORIDE SERPL-SCNC: 106 MMOL/L (ref 95–110)
CHOLEST SERPL-MCNC: 314 MG/DL (ref 120–199)
CHOLEST/HDLC SERPL: 7.9 {RATIO} (ref 2–5)
CO2 SERPL-SCNC: 24 MMOL/L (ref 23–29)
CREAT SERPL-MCNC: 0.9 MG/DL (ref 0.5–1.4)
CRP SERPL-MCNC: 4 MG/L (ref 0–8.2)
DIFFERENTIAL METHOD: ABNORMAL
EOSINOPHIL # BLD AUTO: 0.2 K/UL (ref 0–0.5)
EOSINOPHIL NFR BLD: 1.9 % (ref 0–8)
ERYTHROCYTE [DISTWIDTH] IN BLOOD BY AUTOMATED COUNT: 12.8 % (ref 11.5–14.5)
ERYTHROCYTE [SEDIMENTATION RATE] IN BLOOD BY WESTERGREN METHOD: 27 MM/HR (ref 0–36)
EST. GFR  (AFRICAN AMERICAN): >60 ML/MIN/1.73 M^2
EST. GFR  (NON AFRICAN AMERICAN): >60 ML/MIN/1.73 M^2
GLUCOSE SERPL-MCNC: 103 MG/DL (ref 70–110)
HCT VFR BLD AUTO: 48.5 % (ref 37–48.5)
HDLC SERPL-MCNC: 40 MG/DL (ref 40–75)
HDLC SERPL: 12.7 % (ref 20–50)
HGB BLD-MCNC: 15.4 G/DL (ref 12–16)
IMM GRANULOCYTES # BLD AUTO: 0.03 K/UL (ref 0–0.04)
IMM GRANULOCYTES NFR BLD AUTO: 0.3 % (ref 0–0.5)
LDLC SERPL CALC-MCNC: ABNORMAL MG/DL (ref 63–159)
LYMPHOCYTES # BLD AUTO: 4.5 K/UL (ref 1–4.8)
LYMPHOCYTES NFR BLD: 50.3 % (ref 18–48)
MCH RBC QN AUTO: 31.3 PG (ref 27–31)
MCHC RBC AUTO-ENTMCNC: 31.8 G/DL (ref 32–36)
MCV RBC AUTO: 99 FL (ref 82–98)
MONOCYTES # BLD AUTO: 0.8 K/UL (ref 0.3–1)
MONOCYTES NFR BLD: 8.8 % (ref 4–15)
NEUTROPHILS # BLD AUTO: 3.4 K/UL (ref 1.8–7.7)
NEUTROPHILS NFR BLD: 37.8 % (ref 38–73)
NONHDLC SERPL-MCNC: 274 MG/DL
NRBC BLD-RTO: 0 /100 WBC
PLATELET # BLD AUTO: 287 K/UL (ref 150–350)
PMV BLD AUTO: 11.5 FL (ref 9.2–12.9)
POTASSIUM SERPL-SCNC: 4.6 MMOL/L (ref 3.5–5.1)
PROT SERPL-MCNC: 7.4 G/DL (ref 6–8.4)
RBC # BLD AUTO: 4.92 M/UL (ref 4–5.4)
RHEUMATOID FACT SERPL-ACNC: 12 IU/ML (ref 0–15)
SODIUM SERPL-SCNC: 141 MMOL/L (ref 136–145)
T4 FREE SERPL-MCNC: 0.87 NG/DL (ref 0.71–1.51)
TRIGL SERPL-MCNC: 468 MG/DL (ref 30–150)
TSH SERPL DL<=0.005 MIU/L-ACNC: 1.69 UIU/ML (ref 0.4–4)
WBC # BLD AUTO: 8.88 K/UL (ref 3.9–12.7)

## 2020-03-10 PROCEDURE — 99999 PR PBB SHADOW E&M-EST. PATIENT-LVL III: CPT | Mod: PBBFAC,,, | Performed by: NURSE PRACTITIONER

## 2020-03-10 PROCEDURE — 84439 ASSAY OF FREE THYROXINE: CPT

## 2020-03-10 PROCEDURE — 99396 PREV VISIT EST AGE 40-64: CPT | Mod: S$GLB,,, | Performed by: NURSE PRACTITIONER

## 2020-03-10 PROCEDURE — 82306 VITAMIN D 25 HYDROXY: CPT

## 2020-03-10 PROCEDURE — 99396 PR PREVENTIVE VISIT,EST,40-64: ICD-10-PCS | Mod: S$GLB,,, | Performed by: NURSE PRACTITIONER

## 2020-03-10 PROCEDURE — 80053 COMPREHEN METABOLIC PANEL: CPT

## 2020-03-10 PROCEDURE — 85025 COMPLETE CBC W/AUTO DIFF WBC: CPT

## 2020-03-10 PROCEDURE — 99999 PR PBB SHADOW E&M-EST. PATIENT-LVL III: ICD-10-PCS | Mod: PBBFAC,,, | Performed by: NURSE PRACTITIONER

## 2020-03-10 PROCEDURE — 86431 RHEUMATOID FACTOR QUANT: CPT

## 2020-03-10 PROCEDURE — 36415 COLL VENOUS BLD VENIPUNCTURE: CPT | Mod: PO

## 2020-03-10 PROCEDURE — 84443 ASSAY THYROID STIM HORMONE: CPT

## 2020-03-10 PROCEDURE — 80061 LIPID PANEL: CPT

## 2020-03-10 PROCEDURE — 86140 C-REACTIVE PROTEIN: CPT

## 2020-03-10 PROCEDURE — 85652 RBC SED RATE AUTOMATED: CPT

## 2020-03-10 RX ORDER — ALBUTEROL SULFATE 90 UG/1
2 AEROSOL, METERED RESPIRATORY (INHALATION) EVERY 6 HOURS PRN
Qty: 18 G | Refills: 0 | Status: SHIPPED | OUTPATIENT
Start: 2020-03-10 | End: 2021-02-01 | Stop reason: SDUPTHER

## 2020-03-10 RX ORDER — ALPRAZOLAM 0.5 MG/1
0.5 TABLET ORAL 3 TIMES DAILY PRN
Qty: 30 TABLET | Refills: 0 | Status: CANCELLED | OUTPATIENT
Start: 2020-03-10

## 2020-03-10 RX ORDER — ERGOCALCIFEROL 1.25 MG/1
50000 CAPSULE ORAL
Qty: 30 CAPSULE | Refills: 1 | Status: SHIPPED | OUTPATIENT
Start: 2020-03-10 | End: 2021-02-01 | Stop reason: SDUPTHER

## 2020-03-10 RX ORDER — BUTALBITAL, ASPIRIN, AND CAFFEINE 325; 50; 40 MG/1; MG/1; MG/1
1 CAPSULE ORAL EVERY 6 HOURS PRN
Qty: 30 CAPSULE | Refills: 0 | Status: SHIPPED | OUTPATIENT
Start: 2020-03-10 | End: 2021-02-01 | Stop reason: SDUPTHER

## 2020-03-10 RX ORDER — PRAMIPEXOLE DIHYDROCHLORIDE 0.5 MG/1
TABLET ORAL
Qty: 90 TABLET | Refills: 0 | Status: SHIPPED | OUTPATIENT
Start: 2020-03-10 | End: 2021-02-01 | Stop reason: SDUPTHER

## 2020-03-10 RX ORDER — HYDROXYZINE PAMOATE 25 MG/1
CAPSULE ORAL
Qty: 60 CAPSULE | Refills: 11 | Status: CANCELLED | OUTPATIENT
Start: 2020-03-10

## 2020-03-10 RX ORDER — HYDROXYZINE PAMOATE 25 MG/1
CAPSULE ORAL
Qty: 60 CAPSULE | Refills: 3 | Status: SHIPPED | OUTPATIENT
Start: 2020-03-10 | End: 2021-02-01 | Stop reason: SDUPTHER

## 2020-03-10 RX ORDER — ESTRADIOL 1 MG/1
TABLET ORAL
COMMUNITY
End: 2020-03-11 | Stop reason: SDUPTHER

## 2020-03-10 RX ORDER — ATORVASTATIN CALCIUM 40 MG/1
40 TABLET, FILM COATED ORAL DAILY
Qty: 30 TABLET | Refills: 11 | Status: SHIPPED | OUTPATIENT
Start: 2020-03-10 | End: 2020-03-12 | Stop reason: CLARIF

## 2020-03-10 NOTE — PROGRESS NOTES
[facility] Note    Chief Complaint      Chief Complaint   Patient presents with    Annual Exam     History of Present Illness      Suad Chapman is a 49 y.o. female patient of Dr. Sainz with chronic conditions migraines, RLS, cervical disc herniation, anxiety and depression, hypercholesterolemia, vitamin-D deficiency nicotine dependence who is new to me and presents today for her annual visit.  Patient wants is severe joint and bone pain worse to lower extremities, hand numbness, fatigue and insomnia.  Has tried gabapentin and muscle relaxers in past but make her too tired, states singular does not work for seasonal allergies.  Needs med refills-reports has not been on any for med since December 2019    Flu vaccine-yearly  Labs-ordered  Colonoscopy-due  Gyn/mammo-refer  BMD-due        Problem List Items Addressed This Visit        Neuro    Migraines    Overview     She has chronic migraines and they are very infrequent but she is wanting to have some of the fioricet that she uses on hand for prn use when she gets one.         Relevant Medications    butalbital-aspirin-caffeine -40 mg (FIORINAL) -40 mg Cap    Other Relevant Orders    CBC auto differential (Completed)    Comprehensive metabolic panel (Completed)    TSH (Completed)    T4, free (Completed)    Lipid panel (Completed)    Vitamin D (Completed)    CARLY    C-reactive protein (Completed)    Sedimentation rate (Completed)    Rheumatoid factor (Completed)    RLS (restless legs syndrome)    Overview     She has had RLS for a very long time and she started the mirapex many years ago and it has been helpful.           Relevant Medications    pramipexole (MIRAPEX) 0.5 MG tablet    Other Relevant Orders    CBC auto differential (Completed)    Comprehensive metabolic panel (Completed)    TSH (Completed)    T4, free (Completed)    Lipid panel (Completed)    Vitamin D (Completed)    CARLY    C-reactive protein (Completed)    Sedimentation  rate (Completed)    Rheumatoid factor (Completed)    Cervical radiculopathy    Overview     She has a chronic problem with right neck pain and she has radiculopathy with numbness on her right hand in the median nerve distribution and she has pain in the right hand night.   She has tightness and pain in her neck and laying on her right side causes this to worsen.  This makes her numb in the right arm.           Relevant Orders    CBC auto differential (Completed)    Comprehensive metabolic panel (Completed)    TSH (Completed)    T4, free (Completed)    Lipid panel (Completed)    Vitamin D (Completed)    CARLY    C-reactive protein (Completed)    Sedimentation rate (Completed)    Rheumatoid factor (Completed)       Psychiatric    Anxiety    Overview     The patient has anxiety which been treated over time with xanax and celexa.  Treatment has been given since several years ago.  This has controled the symptoms.  Things that makes it worse include stress and xanax and celexa makes it better.           Relevant Medications    ergocalciferol (ERGOCALCIFEROL) 50,000 unit Cap    atorvastatin (LIPITOR) 40 MG tablet    albuterol (PROVENTIL/VENTOLIN HFA) 90 mcg/actuation inhaler    butalbital-aspirin-caffeine -40 mg (FIORINAL) -40 mg Cap    pramipexole (MIRAPEX) 0.5 MG tablet    hydrOXYzine pamoate (VISTARIL) 25 MG Cap    Other Relevant Orders    CBC auto differential (Completed)    Comprehensive metabolic panel (Completed)    TSH (Completed)    T4, free (Completed)    Lipid panel (Completed)    Vitamin D (Completed)    CARLY    C-reactive protein (Completed)    Sedimentation rate (Completed)    Rheumatoid factor (Completed)    Depression    Overview     She has major depression that has been treated for celexa for a year and this has helped. She was with a psychologist in [place] and was given this and it helped with her agoraphobia also.           Relevant Orders    CBC auto differential (Completed)     Comprehensive metabolic panel (Completed)    TSH (Completed)    T4, free (Completed)    Lipid panel (Completed)    Vitamin D (Completed)    CARLY    C-reactive protein (Completed)    Sedimentation rate (Completed)    Rheumatoid factor (Completed)       Cardiac/Vascular    Pure hypercholesterolemia    Overview     The patient presents with hyperlipidemia.  The patient reports tolerating the medication well and is in excellent compliance.  There have been no medication side effects.  The patient denies chest pain, neuropathy, and myalgias.  The patient has reduced fat intake and has been exercising.  Current treatment has included the medications listed in the med card.    Lab Results   Component Value Date    CHOL 324 (H) 08/09/2017    CHOL 360 (H) 12/17/2009       Lab Results   Component Value Date    HDL 43 08/09/2017    HDL 43 12/17/2009       Lab Results   Component Value Date    LDLCALC 233.6 (H) 08/09/2017    LDLCALC 262.6 (H) 12/17/2009       Lab Results   Component Value Date    TRIG 237 (H) 08/09/2017    TRIG 272 (H) 12/17/2009       Lab Results   Component Value Date    CHOLHDL 13.3 (L) 08/09/2017    CHOLHDL 11.9 (L) 12/17/2009     Lab Results   Component Value Date    ALT 25 08/09/2017    AST 15 12/17/2009    ALKPHOS 80 12/17/2009    BILITOT 0.2 12/17/2009              Relevant Orders    CBC auto differential (Completed)    Comprehensive metabolic panel (Completed)    TSH (Completed)    T4, free (Completed)    Lipid panel (Completed)    Vitamin D (Completed)    CARLY    C-reactive protein (Completed)    Sedimentation rate (Completed)    Rheumatoid factor (Completed)       Endocrine    Vitamin D deficiency    Relevant Medications    ergocalciferol (ERGOCALCIFEROL) 50,000 unit Cap    Other Relevant Orders    CBC auto differential (Completed)    Comprehensive metabolic panel (Completed)    TSH (Completed)    T4, free (Completed)    Lipid panel (Completed)    Vitamin D (Completed)    CARLY    C-reactive protein  (Completed)    Sedimentation rate (Completed)    Rheumatoid factor (Completed)       Other    Cigarette nicotine dependence with nicotine-induced disorder    Overview     We discussed quitting smoking. She is not ready to stop this at this point despite chronic problems with a cough.          Relevant Orders    CBC auto differential (Completed)    Comprehensive metabolic panel (Completed)    TSH (Completed)    T4, free (Completed)    Lipid panel (Completed)    Vitamin D (Completed)    CARLY    C-reactive protein (Completed)    Sedimentation rate (Completed)    Rheumatoid factor (Completed)      Other Visit Diagnoses     Annual physical exam    -  Primary    Relevant Orders    CBC auto differential (Completed)    Comprehensive metabolic panel (Completed)    TSH (Completed)    T4, free (Completed)    Lipid panel (Completed)    Vitamin D (Completed)    CARLY    C-reactive protein (Completed)    Sedimentation rate (Completed)    Rheumatoid factor (Completed)    Hand numbness        Relevant Orders    CBC auto differential (Completed)    Comprehensive metabolic panel (Completed)    TSH (Completed)    T4, free (Completed)    Lipid panel (Completed)    Vitamin D (Completed)    CARLY    C-reactive protein (Completed)    Sedimentation rate (Completed)    Rheumatoid factor (Completed)    Fatigue, unspecified type        Relevant Orders    CBC auto differential (Completed)    Comprehensive metabolic panel (Completed)    TSH (Completed)    T4, free (Completed)    Lipid panel (Completed)    Vitamin D (Completed)    CARLY    C-reactive protein (Completed)    Sedimentation rate (Completed)    Rheumatoid factor (Completed)    Arthralgia, unspecified joint        Relevant Orders    CBC auto differential (Completed)    Comprehensive metabolic panel (Completed)    TSH (Completed)    T4, free (Completed)    Lipid panel (Completed)    Vitamin D (Completed)    CARLY    C-reactive protein (Completed)    Sedimentation rate (Completed)    Rheumatoid  factor (Completed)    Bone pain        Relevant Orders    CBC auto differential (Completed)    Comprehensive metabolic panel (Completed)    TSH (Completed)    T4, free (Completed)    Lipid panel (Completed)    Vitamin D (Completed)    CARLY    C-reactive protein (Completed)    Sedimentation rate (Completed)    Rheumatoid factor (Completed)    Elevated cholesterol        Relevant Medications    atorvastatin (LIPITOR) 40 MG tablet    Acute sinusitis, recurrence not specified, unspecified location        Relevant Medications    albuterol (PROVENTIL/VENTOLIN HFA) 90 mcg/actuation inhaler    Nonintractable migraine, unspecified migraine type        Relevant Medications    butalbital-aspirin-caffeine -40 mg (FIORINAL) -40 mg Cap          Health Maintenance   Topic Date Due    TETANUS VACCINE  08/27/1987    Mammogram  08/09/2018    Colonoscopy  08/27/2019    Lipid Panel  03/10/2021    Pneumococcal Vaccine (Medium Risk)  Completed       Past Medical History:   Diagnosis Date    Allergy     Anxiety     Depression     RLS (restless legs syndrome)     Sciatica        Past Surgical History:   Procedure Laterality Date    COSMETIC SURGERY  2005    tummy tuck    HYSTERECTOMY      OOPHORECTOMY      TONSILLECTOMY      TOTAL REDUCTION MAMMOPLASTY         family history includes Diabetes in her father and mother.    Social History     Tobacco Use    Smoking status: Current Every Day Smoker     Packs/day: 1.00     Years: 30.00     Pack years: 30.00     Types: Cigarettes    Smokeless tobacco: Never Used    Tobacco comment: 1ppd   Substance Use Topics    Alcohol use: Yes     Frequency: Monthly or less     Drinks per session: 1 or 2     Binge frequency: Never     Comment: socially    Drug use: No       Review of Systems   Constitutional: Positive for malaise/fatigue. Negative for chills and fever.   HENT: Negative for congestion, hearing loss, sinus pain and sore throat.    Eyes: Negative for blurred vision  and discharge.   Respiratory: Negative for cough, shortness of breath and wheezing.    Cardiovascular: Negative for chest pain, palpitations and leg swelling.   Gastrointestinal: Negative for abdominal pain, blood in stool, constipation, diarrhea, nausea and vomiting.   Genitourinary: Negative for dysuria and hematuria.   Musculoskeletal: Positive for joint pain and neck pain. Negative for myalgias.   Skin: Negative for rash.   Neurological: Positive for headaches. Negative for dizziness and weakness.   Endo/Heme/Allergies: Negative for polydipsia.   Psychiatric/Behavioral: Negative for depression. The patient has insomnia. The patient is not nervous/anxious.         Outpatient Encounter Medications as of 3/10/2020   Medication Sig Dispense Refill    albuterol (PROVENTIL/VENTOLIN HFA) 90 mcg/actuation inhaler Inhale 2 puffs into the lungs every 6 (six) hours as needed for Wheezing. Rescue 18 g 0    ALPRAZolam (XANAX) 0.5 MG tablet Take 1 tablet (0.5 mg total) by mouth 3 (three) times daily as needed for Insomnia or Anxiety. 60 tablet 5    atorvastatin (LIPITOR) 40 MG tablet Take 1 tablet (40 mg total) by mouth once daily. 30 tablet 11    butalbital-aspirin-caffeine -40 mg (FIORINAL) -40 mg Cap Take 1 capsule by mouth every 6 (six) hours as needed. 30 capsule 0    ergocalciferol (ERGOCALCIFEROL) 50,000 unit Cap Take 1 capsule (50,000 Units total) by mouth every 7 days. 30 capsule 1    pramipexole (MIRAPEX) 0.5 MG tablet TAKE 1 TABLET BY MOUTH THREE TIMES DAILY( EVERY EIGHT HOURS) 90 tablet 0    [DISCONTINUED] albuterol (PROVENTIL/VENTOLIN HFA) 90 mcg/actuation inhaler Inhale 2 puffs into the lungs every 6 (six) hours as needed for Wheezing. Rescue 18 g 0    [DISCONTINUED] atorvastatin (LIPITOR) 40 MG tablet Take 1 tablet (40 mg total) by mouth once daily. 30 tablet 11    [DISCONTINUED] butalbital-aspirin-caffeine -40 mg (FIORINAL) -40 mg Cap Take 1 capsule by mouth every 6 (six) hours  "as needed. 40 capsule 0    [DISCONTINUED] ergocalciferol (ERGOCALCIFEROL) 50,000 unit Cap Take 1 capsule (50,000 Units total) by mouth every 7 days. 30 capsule 3    [DISCONTINUED] estradioL (ESTRACE) 1 MG tablet estradiol 1 mg tablet   take 1 tablet by mouth once daily      [DISCONTINUED] hydrOXYzine pamoate (VISTARIL) 25 MG Cap take 2 capsules by mouth every 4 hours if needed 60 capsule 11    [DISCONTINUED] pramipexole (MIRAPEX) 0.5 MG tablet TAKE 1 TABLET BY MOUTH THREE TIMES DAILY( EVERY EIGHT HOURS) 90 tablet 11    hydrOXYzine pamoate (VISTARIL) 25 MG Cap Take 2 tabs by mouth every 6 hours as needed for anxiety 60 capsule 3    [DISCONTINUED] citalopram (CELEXA) 40 MG tablet Take 1 tablet (40 mg total) by mouth once daily. 30 tablet 11    [DISCONTINUED] estradiol (ESTRACE) 0.5 MG tablet Take 2 tablets (1 mg total) by mouth once daily. 30 tablet 11    [DISCONTINUED] gabapentin (NEURONTIN) 100 MG capsule TAKE 1 CAPSULE(100 MG) BY MOUTH THREE TIMES DAILY 90 capsule 0     No facility-administered encounter medications on file as of 3/10/2020.         Review of patient's allergies indicates:   Allergen Reactions    Sulfa (sulfonamide antibiotics) Rash    Trazodone Anaphylaxis    Tramadol Other (See Comments)     Headaches, elevates blood pressure    Medrol [methylprednisolone] Hives    Sulfamethoxazole-trimethoprim Hives    Corticosteroids (glucocorticoids) Palpitations       Physical Exam      Vital Signs  Temp: 98.6 °F (37 °C)  Temp src: Oral  Pulse: 97  BP: 122/82  BP Location: Left arm  Patient Position: Sitting  Pain Score: 0-No pain  Height and Weight  Height: 5' 7" (170.2 cm)  Weight: 120.2 kg (264 lb 15.9 oz)  BSA (Calculated - sq m): 2.38 sq meters  BMI (Calculated): 41.5  Weight in (lb) to have BMI = 25: 159.3]    Physical Exam   Constitutional: She is oriented to person, place, and time. She appears well-developed and well-nourished.   HENT:   Head: Normocephalic and atraumatic.   Right Ear: " External ear normal.   Left Ear: External ear normal.   Mouth/Throat: Oropharynx is clear and moist.   Eyes: Pupils are equal, round, and reactive to light. Conjunctivae and EOM are normal.   Neck: Normal range of motion. Neck supple. No JVD present. No tracheal deviation present. No thyromegaly present.   Cardiovascular: Normal rate, regular rhythm, normal heart sounds and intact distal pulses.   No murmur heard.  Pulmonary/Chest: Effort normal and breath sounds normal. She exhibits no tenderness.   Abdominal: Soft. Bowel sounds are normal. There is no tenderness. There is no guarding.   Musculoskeletal: Normal range of motion. She exhibits tenderness.   Lymphadenopathy:     She has no cervical adenopathy.   Neurological: She is alert and oriented to person, place, and time.   Skin: Skin is warm and dry.   Psychiatric: She has a normal mood and affect. Her behavior is normal. Judgment and thought content normal.   Nursing note and vitals reviewed.       Laboratory:  CBC:  Recent Labs   Lab Result Units 03/10/20  1039   WBC K/uL 8.88   RBC M/uL 4.92   Hemoglobin g/dL 15.4   Hematocrit % 48.5   Platelets K/uL 287   Mean Corpuscular Volume fL 99*   Mean Corpuscular Hemoglobin pg 31.3*   Mean Corpuscular Hemoglobin Conc g/dL 31.8*     CMP:  Recent Labs   Lab Result Units 03/10/20  1039   Glucose mg/dL 103   Calcium mg/dL 11.0*   Albumin g/dL 4.3   Total Protein g/dL 7.4   Sodium mmol/L 141   Potassium mmol/L 4.6   CO2 mmol/L 24   Chloride mmol/L 106   BUN, Bld mg/dL 19   Alkaline Phosphatase U/L 102   ALT U/L 42   AST U/L 26   Total Bilirubin mg/dL 0.3     URINALYSIS:  No results for input(s): COLORU, CLARITYU, SPECGRAV, PHUR, PROTEINUA, GLUCOSEU, BILIRUBINCON, BLOODU, WBCU, RBCU, BACTERIA, MUCUS, NITRITE, LEUKOCYTESUR, UROBILINOGEN, HYALINECASTS in the last 2160 hours.   LIPIDS:  Recent Labs   Lab Result Units 03/10/20  1039   TSH uIU/mL 1.688   HDL mg/dL 40   Cholesterol mg/dL 314*   Triglycerides mg/dL 468*   LDL  Cholesterol mg/dL Invalid, Trig>400.0   Hdl/Cholesterol Ratio % 12.7*   Non-HDL Cholesterol mg/dL 274   Total Cholesterol/HDL Ratio  7.9*     TSH:  Recent Labs   Lab Result Units 03/10/20  1039   TSH uIU/mL 1.688     A1C:  No results for input(s): HGBA1C in the last 2160 hours.      Assessment/Plan     Suad Chapman is a 49 y.o.female with:    1. Annual physical exam  - CBC auto differential; Future  - Comprehensive metabolic panel; Future  - TSH; Future  - T4, free; Future  - Lipid panel; Future  - Vitamin D; Future  - CARLY; Future  - C-reactive protein; Future  - Sedimentation rate; Future  - Rheumatoid factor; Future  - CARLY    2. Vitamin D deficiency  - CBC auto differential; Future  - Comprehensive metabolic panel; Future  - TSH; Future  - T4, free; Future  - Lipid panel; Future  - Vitamin D; Future  - CARLY; Future  - C-reactive protein; Future  - Sedimentation rate; Future  - Rheumatoid factor; Future  - CARLY  - ergocalciferol (ERGOCALCIFEROL) 50,000 unit Cap; Take 1 capsule (50,000 Units total) by mouth every 7 days.  Dispense: 30 capsule; Refill: 1    3. Hand numbness  - CBC auto differential; Future  - Comprehensive metabolic panel; Future  - TSH; Future  - T4, free; Future  - Lipid panel; Future  - Vitamin D; Future  - CARLY; Future  - C-reactive protein; Future  - Sedimentation rate; Future  - Rheumatoid factor; Future  - CARLY    4. Fatigue, unspecified type  - CBC auto differential; Future  - Comprehensive metabolic panel; Future  - TSH; Future  - T4, free; Future  - Lipid panel; Future  - Vitamin D; Future  - CARLY; Future  - C-reactive protein; Future  - Sedimentation rate; Future  - Rheumatoid factor; Future  - CARLY    5. Arthralgia, unspecified joint  - CBC auto differential; Future  - Comprehensive metabolic panel; Future  - TSH; Future  - T4, free; Future  - Lipid panel; Future  - Vitamin D; Future  - CARLY; Future  - C-reactive protein; Future  - Sedimentation rate; Future  - Rheumatoid factor; Future  -  CARLY    6. Bone pain  - CBC auto differential; Future  - Comprehensive metabolic panel; Future  - TSH; Future  - T4, free; Future  - Lipid panel; Future  - Vitamin D; Future  - CARLY; Future  - C-reactive protein; Future  - Sedimentation rate; Future  - Rheumatoid factor; Future  - CARLY    7. Migraine without status migrainosus, not intractable, unspecified migraine type  - CBC auto differential; Future  - Comprehensive metabolic panel; Future  - TSH; Future  - T4, free; Future  - Lipid panel; Future  - Vitamin D; Future  - CARLY; Future  - C-reactive protein; Future  - Sedimentation rate; Future  - Rheumatoid factor; Future  - CARLY    8. Cervical radiculopathy  - CBC auto differential; Future  - Comprehensive metabolic panel; Future  - TSH; Future  - T4, free; Future  - Lipid panel; Future  - Vitamin D; Future  - CARLY; Future  - C-reactive protein; Future  - Sedimentation rate; Future  - Rheumatoid factor; Future  - CARLY    9. Anxiety  - CBC auto differential; Future  - Comprehensive metabolic panel; Future  - TSH; Future  - T4, free; Future  - Lipid panel; Future  - Vitamin D; Future  - CARLY; Future  - C-reactive protein; Future  - Sedimentation rate; Future  - Rheumatoid factor; Future  - CARLY  - ergocalciferol (ERGOCALCIFEROL) 50,000 unit Cap; Take 1 capsule (50,000 Units total) by mouth every 7 days.  Dispense: 30 capsule; Refill: 1  - atorvastatin (LIPITOR) 40 MG tablet; Take 1 tablet (40 mg total) by mouth once daily.  Dispense: 30 tablet; Refill: 11  - albuterol (PROVENTIL/VENTOLIN HFA) 90 mcg/actuation inhaler; Inhale 2 puffs into the lungs every 6 (six) hours as needed for Wheezing. Rescue  Dispense: 18 g; Refill: 0  - butalbital-aspirin-caffeine -40 mg (FIORINAL) -40 mg Cap; Take 1 capsule by mouth every 6 (six) hours as needed.  Dispense: 30 capsule; Refill: 0  - pramipexole (MIRAPEX) 0.5 MG tablet; TAKE 1 TABLET BY MOUTH THREE TIMES DAILY( EVERY EIGHT HOURS)  Dispense: 90 tablet; Refill: 0  -  hydrOXYzine pamoate (VISTARIL) 25 MG Cap; Take 2 tabs by mouth every 6 hours as needed for anxiety  Dispense: 60 capsule; Refill: 3    10. Depression, unspecified depression type  - CBC auto differential; Future  - Comprehensive metabolic panel; Future  - TSH; Future  - T4, free; Future  - Lipid panel; Future  - Vitamin D; Future  - CARLY; Future  - C-reactive protein; Future  - Sedimentation rate; Future  - Rheumatoid factor; Future  - CARLY    11. Pure hypercholesterolemia  - CBC auto differential; Future  - Comprehensive metabolic panel; Future  - TSH; Future  - T4, free; Future  - Lipid panel; Future  - Vitamin D; Future  - CARLY; Future  - C-reactive protein; Future  - Sedimentation rate; Future  - Rheumatoid factor; Future  - CARLY    12. Cigarette nicotine dependence with nicotine-induced disorder  - CBC auto differential; Future  - Comprehensive metabolic panel; Future  - TSH; Future  - T4, free; Future  - Lipid panel; Future  - Vitamin D; Future  - CARLY; Future  - C-reactive protein; Future  - Sedimentation rate; Future  - Rheumatoid factor; Future  - CARLY    13. RLS (restless legs syndrome)  - CBC auto differential; Future  - Comprehensive metabolic panel; Future  - TSH; Future  - T4, free; Future  - Lipid panel; Future  - Vitamin D; Future  - CARLY; Future  - C-reactive protein; Future  - Sedimentation rate; Future  - Rheumatoid factor; Future  - CARLY  - pramipexole (MIRAPEX) 0.5 MG tablet; TAKE 1 TABLET BY MOUTH THREE TIMES DAILY( EVERY EIGHT HOURS)  Dispense: 90 tablet; Refill: 0    14. Elevated cholesterol  - atorvastatin (LIPITOR) 40 MG tablet; Take 1 tablet (40 mg total) by mouth once daily.  Dispense: 30 tablet; Refill: 11    15. Acute sinusitis, recurrence not specified, unspecified location  - albuterol (PROVENTIL/VENTOLIN HFA) 90 mcg/actuation inhaler; Inhale 2 puffs into the lungs every 6 (six) hours as needed for Wheezing. Rescue  Dispense: 18 g; Refill: 0    16. Nonintractable migraine, unspecified  migraine type  - butalbital-aspirin-caffeine -40 mg (FIORINAL) -40 mg Cap; Take 1 capsule by mouth every 6 (six) hours as needed.  Dispense: 30 capsule; Refill: 0    Will not refill xanax, unknown med on , pt denies getting this prescription    -Continue current medications and maintain follow up with specialists.  Return to clinic within 3 months with a position or sooner for any concerns       Erin Jiminez, NP-C [facility] [place]

## 2020-03-11 RX ORDER — ESTRADIOL 1 MG/1
TABLET ORAL
Qty: 30 TABLET | Refills: 0 | Status: SHIPPED | OUTPATIENT
Start: 2020-03-11 | End: 2021-02-01 | Stop reason: SDUPTHER

## 2020-03-12 ENCOUNTER — PATIENT MESSAGE (OUTPATIENT)
Dept: INTERNAL MEDICINE | Facility: CLINIC | Age: 50
End: 2020-03-12

## 2020-03-12 DIAGNOSIS — E55.9 VITAMIN D DEFICIENCY: ICD-10-CM

## 2020-03-12 DIAGNOSIS — E78.00 PURE HYPERCHOLESTEROLEMIA: Primary | ICD-10-CM

## 2020-03-12 RX ORDER — PRAVASTATIN SODIUM 40 MG/1
40 TABLET ORAL DAILY
Qty: 90 TABLET | Refills: 3 | Status: SHIPPED | OUTPATIENT
Start: 2020-03-12 | End: 2021-03-12

## 2020-03-12 NOTE — TELEPHONE ENCOUNTER
Labs ok,   Vit d low,needs to restart prescription  Cholesterol panel high, will change med and want pt to take fish oil 1000mg twice daily    Want to repeat these labs in 12 weeks  Watch fats, alcohol and fried foods

## 2020-08-17 ENCOUNTER — TELEPHONE (OUTPATIENT)
Dept: ORTHOPEDICS | Facility: CLINIC | Age: 50
End: 2020-08-17

## 2020-08-17 DIAGNOSIS — F41.9 ANXIETY: ICD-10-CM

## 2020-08-17 DIAGNOSIS — M25.572 ACUTE LEFT ANKLE PAIN: Primary | ICD-10-CM

## 2020-08-17 RX ORDER — ALPRAZOLAM 0.5 MG/1
0.5 TABLET ORAL 3 TIMES DAILY PRN
Qty: 60 TABLET | Refills: 5 | OUTPATIENT
Start: 2020-08-17

## 2020-08-17 NOTE — TELEPHONE ENCOUNTER
"Called pt. She scheduled an appt with Dr. Henderson for ankle fx. Needed more info from pt. Pt states that she has been hiving pain in her left ankle/foot for a little while now. States that the pain got bad this weekend. She went to an urgent care near her home. She states that xrays were taken and they showed possible bone spur, or fx on the "back of her ankle". Advised pt that Dr. Julio is our foot and ankle specialist. Rescheduled to have pt see Dr. Julio tomorrow. Advised pt that we will get new xrays at appt. Pt agrees to this. Pt also states that she was placed in a boot. Thanks, Rosa Maria"

## 2020-08-18 ENCOUNTER — PATIENT OUTREACH (OUTPATIENT)
Dept: ADMINISTRATIVE | Facility: OTHER | Age: 50
End: 2020-08-18

## 2020-08-18 ENCOUNTER — OFFICE VISIT (OUTPATIENT)
Dept: ORTHOPEDICS | Facility: CLINIC | Age: 50
End: 2020-08-18
Payer: COMMERCIAL

## 2020-08-18 ENCOUNTER — HOSPITAL ENCOUNTER (OUTPATIENT)
Dept: RADIOLOGY | Facility: HOSPITAL | Age: 50
Discharge: HOME | End: 2020-08-18
Attending: ORTHOPAEDIC SURGERY
Payer: COMMERCIAL

## 2020-08-18 VITALS
HEART RATE: 95 BPM | BODY MASS INDEX: 41.59 KG/M2 | SYSTOLIC BLOOD PRESSURE: 117 MMHG | HEIGHT: 67 IN | WEIGHT: 265 LBS | DIASTOLIC BLOOD PRESSURE: 69 MMHG

## 2020-08-18 DIAGNOSIS — M25.572 ACUTE LEFT ANKLE PAIN: ICD-10-CM

## 2020-08-18 DIAGNOSIS — Z12.11 SCREENING FOR COLON CANCER: Primary | ICD-10-CM

## 2020-08-18 DIAGNOSIS — M25.572 ACUTE LEFT ANKLE PAIN: Primary | ICD-10-CM

## 2020-08-18 DIAGNOSIS — Z12.31 BREAST CANCER SCREENING BY MAMMOGRAM: ICD-10-CM

## 2020-08-18 PROCEDURE — 99999 PR PBB SHADOW E&M-EST. PATIENT-LVL IV: CPT | Mod: PBBFAC,,, | Performed by: ORTHOPAEDIC SURGERY

## 2020-08-18 PROCEDURE — 99244 OFF/OP CNSLTJ NEW/EST MOD 40: CPT | Mod: S$GLB,,, | Performed by: ORTHOPAEDIC SURGERY

## 2020-08-18 PROCEDURE — 99999 PR PBB SHADOW E&M-EST. PATIENT-LVL IV: ICD-10-PCS | Mod: PBBFAC,,, | Performed by: ORTHOPAEDIC SURGERY

## 2020-08-18 PROCEDURE — 73610 XR ANKLE COMPLETE 3 VIEW LEFT: ICD-10-PCS | Mod: 26,LT,, | Performed by: RADIOLOGY

## 2020-08-18 PROCEDURE — 73610 X-RAY EXAM OF ANKLE: CPT | Mod: 26,LT,, | Performed by: RADIOLOGY

## 2020-08-18 PROCEDURE — 73610 X-RAY EXAM OF ANKLE: CPT | Mod: TC,PO,LT

## 2020-08-18 PROCEDURE — 99244 PR OFFICE CONSULTATION,LEVEL IV: ICD-10-PCS | Mod: S$GLB,,, | Performed by: ORTHOPAEDIC SURGERY

## 2020-08-18 RX ORDER — NABUMETONE 500 MG/1
500 TABLET, FILM COATED ORAL 2 TIMES DAILY
Qty: 20 TABLET | Refills: 0 | Status: SHIPPED | OUTPATIENT
Start: 2020-08-18 | End: 2020-08-28

## 2020-08-18 NOTE — PROGRESS NOTES
LINKS immunization registry updated  Care Everywhere updated  Health Maintenance updated  DIS/bains reviewed for overdue Proactive [facility] ([facility]) health maintenance testing (CRS, Breast Ca, Diabetic Eye Exam)   Orders entered: [employer], Screening mammogram

## 2020-08-18 NOTE — PROGRESS NOTES
"HPI: Suad Chapman is a  49 y.o. female  who was referred to me by Dr. Henderson and was seen in consultation today for left ankle pain. She describes having twisted her ankle while at a wedding in November 2019. Then in February she started having severe pain which did resolve. She says then the ankle started swelling severely. She went to urgent care and is here for eval and tx. Pt denies weakness, numbness, and tingling.The pain has now become constant for the past 3-4 weeks and is keeping her up at night.   She works as an . She has tried ice, activity modification, multiple nsaids, elevation, epsom soaks,  and even aspercreme without relief. She says the pain gets so bad she cannot walk. It is mainly on the outside of the ankle but also the very inferior most and posterior most aspect of her heel. She rates her pain as 8/10 today. She also has h/o vitamin D deficiency.     PAST MEDICAL/SURGICAL/FAMILY/SOCIAL/ HISTORY: REVIEWED  1/2-1 ppd x 20 years, current smoker    ALLERGIES/MEDICATIONS: REVIEWED       Review of Systems:     Constitution: Negative.   HEENT: Negative.   Eyes: Negative.   Cardiovascular: Negative.   Respiratory: Negative.   Endocrine: Negative.   Hematologic/Lymphatic: Negative.   Skin: Negative.   Musculoskeletal: Positive for left ankle pain   Gastrointestinal: Negative.   Genitourinary: Negative.   Neurological: Negative.   Psychiatric/Behavioral: Negative.   Allergic/Immunologic: Negative.       PHYSICAL EXAM:  Vitals:    08/18/20 1502   BP: 117/69   Pulse: 95     Ht Readings from Last 1 Encounters:   08/18/20 5' 7" (1.702 m)     Wt Readings from Last 1 Encounters:   08/18/20 120.2 kg (264 lb 15.9 oz)       GENERAL: Well developed, well nourished, no acute distress. Morbidly obese, BMI 41.5  SKIN: Skin is intact. No atrophy, abrasions or lesions are noted.   Neurological: Normal mental status. Appropriate and conversant. Alert and oriented x 3.  GAIT: Walks with an antalgic " gait.    Left  lower extremity compared with RLE:  2+ dorsalis pedis pulse.  Capillary refill < 3 seconds.  Normal range of motion tibiotalar and subtalar joints. Normal alignment of the forefoot and the hindfoot.  5/5 strength EHL, FHL, tibialis anterior, gastrocsoleus, tibialis posterior and peroneals. Sensation to light touch intact sural, saphenous, superficial peroneal and deep peroneal nerves. Mild swelling of the lateral ankle, no ecchymosis or deformity. No lymphadenopathy, no masses or tumors palpated.  Mild tenderness to palpation very inferior most and posterior most aspect of her heel. non-tender to palpation proximal medial insertion of the plantar fascia.  tenderness to palpation at the distal fibula. non-tender to palpation at the ankle joint.     XRAYS:   3 views of left ankle obtained and reviewed today reveal No evidence of fractures or dislocations. No spurring at the insertion of the achilles tendon. Mild spur at insertion of the plantar fascia.       ASSESSMENT: Left  ankle pain and heel pain    Medications Ordered This Encounter   Medications    nabumetone (RELAFEN) 500 MG tablet       Encounter Diagnosis   Name Primary?    Acute left ankle pain Yes      PLAN:   I spent 30 minutes in consulation with the patient today. More than half the time was spent counseling the patient on her condition.  We performed a custom orthotic/brace adjustment, fitting and training with the patient today. The patient demonstrated understanding and proper care. This was performed for 15 minutes.  Short boot was given.     She has conservative modalities have to provide any relief of symptoms. I ordered an MRI of the left ankle to evaluate for possible stress fracture of the fibula.   F/u post MRI to discuss a treatment plan.

## 2020-08-28 ENCOUNTER — TELEPHONE (OUTPATIENT)
Dept: ORTHOPEDICS | Facility: CLINIC | Age: 50
End: 2020-08-28

## 2020-08-28 NOTE — TELEPHONE ENCOUNTER
LM for patient to verify that she had the mri done, if not she can cancel her follow up appt to review results.

## 2020-08-31 ENCOUNTER — TELEPHONE (OUTPATIENT)
Dept: ORTHOPEDICS | Facility: CLINIC | Age: 50
End: 2020-08-31

## 2020-10-05 ENCOUNTER — PATIENT MESSAGE (OUTPATIENT)
Dept: ADMINISTRATIVE | Facility: HOSPITAL | Age: 50
End: 2020-10-05

## 2021-02-01 ENCOUNTER — OFFICE VISIT (OUTPATIENT)
Dept: INTERNAL MEDICINE | Facility: CLINIC | Age: 51
End: 2021-02-01
Payer: COMMERCIAL

## 2021-02-01 ENCOUNTER — LAB VISIT (OUTPATIENT)
Dept: LAB | Facility: HOSPITAL | Age: 51
End: 2021-02-01
Attending: NURSE PRACTITIONER
Payer: COMMERCIAL

## 2021-02-01 VITALS
DIASTOLIC BLOOD PRESSURE: 76 MMHG | BODY MASS INDEX: 31.24 KG/M2 | SYSTOLIC BLOOD PRESSURE: 100 MMHG | HEIGHT: 67 IN | RESPIRATION RATE: 16 BRPM | HEART RATE: 100 BPM | TEMPERATURE: 98 F | WEIGHT: 199.06 LBS

## 2021-02-01 DIAGNOSIS — G43.909 MIGRAINE WITHOUT STATUS MIGRAINOSUS, NOT INTRACTABLE, UNSPECIFIED MIGRAINE TYPE: ICD-10-CM

## 2021-02-01 DIAGNOSIS — Z00.00 ANNUAL PHYSICAL EXAM: Primary | ICD-10-CM

## 2021-02-01 DIAGNOSIS — Z12.39 ENCOUNTER FOR SCREENING FOR MALIGNANT NEOPLASM OF BREAST, UNSPECIFIED SCREENING MODALITY: ICD-10-CM

## 2021-02-01 DIAGNOSIS — F41.9 ANXIETY: ICD-10-CM

## 2021-02-01 DIAGNOSIS — G25.81 RLS (RESTLESS LEGS SYNDROME): ICD-10-CM

## 2021-02-01 DIAGNOSIS — F17.219 CIGARETTE NICOTINE DEPENDENCE WITH NICOTINE-INDUCED DISORDER: ICD-10-CM

## 2021-02-01 DIAGNOSIS — E78.00 PURE HYPERCHOLESTEROLEMIA: ICD-10-CM

## 2021-02-01 DIAGNOSIS — E55.9 VITAMIN D DEFICIENCY: ICD-10-CM

## 2021-02-01 DIAGNOSIS — E55.9 VITAMIN D INSUFFICIENCY: ICD-10-CM

## 2021-02-01 DIAGNOSIS — Z13.220 SCREENING FOR LIPID DISORDERS: ICD-10-CM

## 2021-02-01 DIAGNOSIS — Z00.00 ANNUAL PHYSICAL EXAM: ICD-10-CM

## 2021-02-01 LAB
25(OH)D3+25(OH)D2 SERPL-MCNC: 35 NG/ML (ref 30–96)
ALBUMIN SERPL BCP-MCNC: 3.8 G/DL (ref 3.5–5.2)
ALP SERPL-CCNC: 87 U/L (ref 55–135)
ALT SERPL W/O P-5'-P-CCNC: 18 U/L (ref 10–44)
ANION GAP SERPL CALC-SCNC: 14 MMOL/L (ref 8–16)
AST SERPL-CCNC: 21 U/L (ref 10–40)
BASOPHILS # BLD AUTO: 0.07 K/UL (ref 0–0.2)
BASOPHILS NFR BLD: 0.7 % (ref 0–1.9)
BILIRUB SERPL-MCNC: 0.4 MG/DL (ref 0.1–1)
BUN SERPL-MCNC: 10 MG/DL (ref 6–20)
CALCIUM SERPL-MCNC: 9.7 MG/DL (ref 8.7–10.5)
CHLORIDE SERPL-SCNC: 109 MMOL/L (ref 95–110)
CHOLEST SERPL-MCNC: 248 MG/DL (ref 120–199)
CHOLEST/HDLC SERPL: 7.8 {RATIO} (ref 2–5)
CO2 SERPL-SCNC: 22 MMOL/L (ref 23–29)
CREAT SERPL-MCNC: 0.8 MG/DL (ref 0.5–1.4)
DIFFERENTIAL METHOD: ABNORMAL
EOSINOPHIL # BLD AUTO: 0.2 K/UL (ref 0–0.5)
EOSINOPHIL NFR BLD: 1.9 % (ref 0–8)
ERYTHROCYTE [DISTWIDTH] IN BLOOD BY AUTOMATED COUNT: 15 % (ref 11.5–14.5)
EST. GFR  (AFRICAN AMERICAN): >60 ML/MIN/1.73 M^2
EST. GFR  (NON AFRICAN AMERICAN): >60 ML/MIN/1.73 M^2
GLUCOSE SERPL-MCNC: 98 MG/DL (ref 70–110)
HCT VFR BLD AUTO: 45.9 % (ref 37–48.5)
HDLC SERPL-MCNC: 32 MG/DL (ref 40–75)
HDLC SERPL: 12.9 % (ref 20–50)
HGB BLD-MCNC: 15 G/DL (ref 12–16)
IMM GRANULOCYTES # BLD AUTO: 0.03 K/UL (ref 0–0.04)
IMM GRANULOCYTES NFR BLD AUTO: 0.3 % (ref 0–0.5)
LDLC SERPL CALC-MCNC: 162.2 MG/DL (ref 63–159)
LYMPHOCYTES # BLD AUTO: 4.3 K/UL (ref 1–4.8)
LYMPHOCYTES NFR BLD: 43.1 % (ref 18–48)
MCH RBC QN AUTO: 31.6 PG (ref 27–31)
MCHC RBC AUTO-ENTMCNC: 32.7 G/DL (ref 32–36)
MCV RBC AUTO: 97 FL (ref 82–98)
MONOCYTES # BLD AUTO: 0.9 K/UL (ref 0.3–1)
MONOCYTES NFR BLD: 9 % (ref 4–15)
NEUTROPHILS # BLD AUTO: 4.5 K/UL (ref 1.8–7.7)
NEUTROPHILS NFR BLD: 45 % (ref 38–73)
NONHDLC SERPL-MCNC: 216 MG/DL
NRBC BLD-RTO: 0 /100 WBC
PLATELET # BLD AUTO: 222 K/UL (ref 150–350)
PMV BLD AUTO: 12.7 FL (ref 9.2–12.9)
POTASSIUM SERPL-SCNC: 4 MMOL/L (ref 3.5–5.1)
PROT SERPL-MCNC: 6.8 G/DL (ref 6–8.4)
RBC # BLD AUTO: 4.74 M/UL (ref 4–5.4)
SODIUM SERPL-SCNC: 145 MMOL/L (ref 136–145)
T4 FREE SERPL-MCNC: 0.93 NG/DL (ref 0.71–1.51)
TRIGL SERPL-MCNC: 269 MG/DL (ref 30–150)
TSH SERPL DL<=0.005 MIU/L-ACNC: 1.74 UIU/ML (ref 0.4–4)
WBC # BLD AUTO: 10.07 K/UL (ref 3.9–12.7)

## 2021-02-01 PROCEDURE — 99396 PR PREVENTIVE VISIT,EST,40-64: ICD-10-PCS | Mod: S$GLB,,, | Performed by: NURSE PRACTITIONER

## 2021-02-01 PROCEDURE — 84439 ASSAY OF FREE THYROXINE: CPT

## 2021-02-01 PROCEDURE — 1126F PR PAIN SEVERITY QUANTIFIED, NO PAIN PRESENT: ICD-10-PCS | Mod: S$GLB,,, | Performed by: NURSE PRACTITIONER

## 2021-02-01 PROCEDURE — 36415 COLL VENOUS BLD VENIPUNCTURE: CPT | Mod: PO

## 2021-02-01 PROCEDURE — 99999 PR PBB SHADOW E&M-EST. PATIENT-LVL III: CPT | Mod: PBBFAC,,, | Performed by: NURSE PRACTITIONER

## 2021-02-01 PROCEDURE — 80061 LIPID PANEL: CPT

## 2021-02-01 PROCEDURE — 3008F PR BODY MASS INDEX (BMI) DOCUMENTED: ICD-10-PCS | Mod: CPTII,S$GLB,, | Performed by: NURSE PRACTITIONER

## 2021-02-01 PROCEDURE — 99396 PREV VISIT EST AGE 40-64: CPT | Mod: S$GLB,,, | Performed by: NURSE PRACTITIONER

## 2021-02-01 PROCEDURE — 80053 COMPREHEN METABOLIC PANEL: CPT

## 2021-02-01 PROCEDURE — 99999 PR PBB SHADOW E&M-EST. PATIENT-LVL III: ICD-10-PCS | Mod: PBBFAC,,, | Performed by: NURSE PRACTITIONER

## 2021-02-01 PROCEDURE — 85025 COMPLETE CBC W/AUTO DIFF WBC: CPT

## 2021-02-01 PROCEDURE — 1126F AMNT PAIN NOTED NONE PRSNT: CPT | Mod: S$GLB,,, | Performed by: NURSE PRACTITIONER

## 2021-02-01 PROCEDURE — 82306 VITAMIN D 25 HYDROXY: CPT

## 2021-02-01 PROCEDURE — 84443 ASSAY THYROID STIM HORMONE: CPT

## 2021-02-01 PROCEDURE — 3008F BODY MASS INDEX DOCD: CPT | Mod: CPTII,S$GLB,, | Performed by: NURSE PRACTITIONER

## 2021-02-01 RX ORDER — ERGOCALCIFEROL 1.25 MG/1
50000 CAPSULE ORAL
Qty: 30 CAPSULE | Refills: 0 | Status: SHIPPED | OUTPATIENT
Start: 2021-02-01

## 2021-02-01 RX ORDER — BUTALBITAL, ASPIRIN, AND CAFFEINE 325; 50; 40 MG/1; MG/1; MG/1
1 CAPSULE ORAL EVERY 6 HOURS PRN
Qty: 30 CAPSULE | Refills: 0 | Status: SHIPPED | OUTPATIENT
Start: 2021-02-01

## 2021-02-01 RX ORDER — PRAMIPEXOLE DIHYDROCHLORIDE 0.5 MG/1
TABLET ORAL
Qty: 90 TABLET | Refills: 0 | Status: SHIPPED | OUTPATIENT
Start: 2021-02-01

## 2021-02-01 RX ORDER — ESTRADIOL 1 MG/1
TABLET ORAL
Qty: 30 TABLET | Refills: 0 | Status: SHIPPED | OUTPATIENT
Start: 2021-02-01

## 2021-02-01 RX ORDER — ALBUTEROL SULFATE 90 UG/1
2 AEROSOL, METERED RESPIRATORY (INHALATION) EVERY 6 HOURS PRN
Qty: 18 G | Refills: 0 | Status: SHIPPED | OUTPATIENT
Start: 2021-02-01

## 2021-02-01 RX ORDER — HYDROXYZINE PAMOATE 25 MG/1
CAPSULE ORAL
Qty: 60 CAPSULE | Refills: 3 | Status: SHIPPED | OUTPATIENT
Start: 2021-02-01

## 2021-02-02 ENCOUNTER — PATIENT MESSAGE (OUTPATIENT)
Dept: INTERNAL MEDICINE | Facility: CLINIC | Age: 51
End: 2021-02-02

## 2021-04-23 ENCOUNTER — PATIENT MESSAGE (OUTPATIENT)
Dept: FAMILY MEDICINE | Facility: CLINIC | Age: 51
End: 2021-04-23

## 2021-04-23 DIAGNOSIS — F41.9 ANXIETY: ICD-10-CM

## 2021-04-23 RX ORDER — ALPRAZOLAM 0.5 MG/1
0.5 TABLET ORAL 3 TIMES DAILY PRN
Qty: 60 TABLET | Refills: 5 | OUTPATIENT
Start: 2021-04-23

## 2021-10-08 ENCOUNTER — PATIENT MESSAGE (OUTPATIENT)
Dept: FAMILY MEDICINE | Facility: CLINIC | Age: 51
End: 2021-10-08

## 2021-11-16 NOTE — LETTER
August 18, 2020        Lazarus Henderson MD  [address] [place] [address]             [facility] [place]  [address] [place] [address]  Phone: [phone]   Patient: Suad Chapman   MR Number: [id number]   YOB: 1970   Date of Visit: 8/18/2020       Dear Dr. Henderson:    Thank you for referring Suad Chapman to me for evaluation. Below are the relevant portions of my assessment and plan of care.            If you have questions, please do not hesitate to call me. I look forward to following Suad along with you.    Sincerely,      Tyler Julio MD           CC  No Recipients       
medical evaluation

## 2022-01-02 ENCOUNTER — PATIENT MESSAGE (OUTPATIENT)
Dept: ADMINISTRATIVE | Facility: HOSPITAL | Age: 52
End: 2022-01-02
Payer: COMMERCIAL

## 2022-07-08 ENCOUNTER — PATIENT MESSAGE (OUTPATIENT)
Dept: FAMILY MEDICINE | Facility: CLINIC | Age: 52
End: 2022-07-08
Payer: COMMERCIAL

## 2022-09-14 DIAGNOSIS — Z12.31 OTHER SCREENING MAMMOGRAM: ICD-10-CM

## 2022-12-20 DIAGNOSIS — Z12.31 OTHER SCREENING MAMMOGRAM: ICD-10-CM

## 2022-12-23 ENCOUNTER — PATIENT MESSAGE (OUTPATIENT)
Dept: FAMILY MEDICINE | Facility: CLINIC | Age: 52
End: 2022-12-23
Payer: COMMERCIAL